# Patient Record
Sex: FEMALE | Race: WHITE | NOT HISPANIC OR LATINO | Employment: UNEMPLOYED | URBAN - METROPOLITAN AREA
[De-identification: names, ages, dates, MRNs, and addresses within clinical notes are randomized per-mention and may not be internally consistent; named-entity substitution may affect disease eponyms.]

---

## 2022-04-24 ENCOUNTER — HOSPITAL ENCOUNTER (EMERGENCY)
Facility: HOSPITAL | Age: 62
Discharge: HOME | End: 2022-04-24
Attending: STUDENT IN AN ORGANIZED HEALTH CARE EDUCATION/TRAINING PROGRAM
Payer: MEDICAID

## 2022-04-24 VITALS
BODY MASS INDEX: 24.27 KG/M2 | DIASTOLIC BLOOD PRESSURE: 70 MMHG | WEIGHT: 145.81 LBS | HEART RATE: 70 BPM | OXYGEN SATURATION: 94 % | TEMPERATURE: 98 F | RESPIRATION RATE: 18 BRPM | SYSTOLIC BLOOD PRESSURE: 112 MMHG

## 2022-04-24 DIAGNOSIS — S01.81XA FACIAL LACERATION, INITIAL ENCOUNTER: ICD-10-CM
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
## 2022-04-24 DIAGNOSIS — S42.292A CLOSED FRACTURE OF HEAD OF LEFT HUMERUS, INITIAL ENCOUNTER: ICD-10-CM

## 2022-04-24 PROCEDURE — 25000003 PHARM REV CODE 250: Performed by: STUDENT IN AN ORGANIZED HEALTH CARE EDUCATION/TRAINING PROGRAM

## 2022-04-24 PROCEDURE — 63600175 PHARM REV CODE 636 W HCPCS: Performed by: STUDENT IN AN ORGANIZED HEALTH CARE EDUCATION/TRAINING PROGRAM

## 2022-04-24 PROCEDURE — 12011 RPR F/E/E/N/L/M 2.5 CM/<: CPT

## 2022-04-24 PROCEDURE — 90471 IMMUNIZATION ADMIN: CPT | Performed by: STUDENT IN AN ORGANIZED HEALTH CARE EDUCATION/TRAINING PROGRAM

## 2022-04-24 PROCEDURE — 90715 TDAP VACCINE 7 YRS/> IM: CPT | Performed by: STUDENT IN AN ORGANIZED HEALTH CARE EDUCATION/TRAINING PROGRAM

## 2022-04-24 PROCEDURE — 99284 EMERGENCY DEPT VISIT MOD MDM: CPT | Mod: 25

## 2022-04-24 PROCEDURE — 96372 THER/PROPH/DIAG INJ SC/IM: CPT | Mod: 59 | Performed by: STUDENT IN AN ORGANIZED HEALTH CARE EDUCATION/TRAINING PROGRAM

## 2022-04-24 RX ORDER — ONDANSETRON 4 MG/1
4 TABLET, ORALLY DISINTEGRATING ORAL
Status: COMPLETED | OUTPATIENT
Start: 2022-04-24 | End: 2022-04-24

## 2022-04-24 RX ORDER — MORPHINE SULFATE 4 MG/ML
4 INJECTION, SOLUTION INTRAMUSCULAR; INTRAVENOUS
Status: COMPLETED | OUTPATIENT
Start: 2022-04-24 | End: 2022-04-24

## 2022-04-24 RX ORDER — HYDROCODONE BITARTRATE AND ACETAMINOPHEN 5; 325 MG/1; MG/1
1 TABLET ORAL EVERY 4 HOURS PRN
Qty: 18 TABLET | Refills: 0 | Status: SHIPPED | OUTPATIENT
Start: 2022-04-24 | End: 2022-04-27

## 2022-04-24 RX ORDER — MORPHINE SULFATE 4 MG/ML
4 INJECTION, SOLUTION INTRAMUSCULAR; INTRAVENOUS
Status: DISCONTINUED | OUTPATIENT
Start: 2022-04-24 | End: 2022-04-24

## 2022-04-24 RX ADMIN — TETANUS TOXOID, REDUCED DIPHTHERIA TOXOID AND ACELLULAR PERTUSSIS VACCINE, ADSORBED 0.5 ML: 5; 2.5; 8; 8; 2.5 SUSPENSION INTRAMUSCULAR at 04:04

## 2022-04-24 RX ADMIN — MORPHINE SULFATE 4 MG: 4 INJECTION INTRAVENOUS at 04:04

## 2022-04-24 RX ADMIN — ONDANSETRON 4 MG: 4 TABLET, ORALLY DISINTEGRATING ORAL at 04:04

## 2022-04-24 NOTE — ED PROVIDER NOTES
Encounter Date: 2022    This document was partially completed using speech recognition software and may contain misspellings, grammatical errors, and/or unexpected word substitutions.       History     Chief Complaint   Patient presents with    Fall     61-year-old female with history of hypertension presents emergency department with her  with a left eyebrow laceration and left shoulder pain.  She was walking at the store when her foot tripped on the carpet and she fell face first.  She did not lose consciousness or get knocked out.  She denies any neck pain or back pain.  Ambulatory afterwards.  She is presenting with left shoulder pain.  Denies any headaches, numbness, weakness, nausea, vomiting.  She felt a crack in her left shoulder. Unknown last tetanus shot.        Review of patient's allergies indicates:  No Known Allergies  Past Medical History:   Diagnosis Date    Hypertension     Kidney problem      Past Surgical History:   Procedure Laterality Date     SECTION, CLASSIC      HYSTERECTOMY      KIDNEY SURGERY       Family History   Problem Relation Age of Onset    Heart attack Father         dec    Heart disease Father      Social History     Tobacco Use    Smoking status: Never Smoker    Smokeless tobacco: Never Used   Substance Use Topics    Alcohol use: Yes     Comment: occasionally    Drug use: No     Review of Systems   Constitutional: Negative for chills and fever.   HENT: Negative for congestion, rhinorrhea and sneezing.    Eyes: Negative for discharge and redness.   Respiratory: Negative for cough and shortness of breath.    Cardiovascular: Negative for chest pain and palpitations.   Gastrointestinal: Negative for abdominal pain, diarrhea, nausea and vomiting.   Genitourinary: Negative for dysuria, frequency, vaginal bleeding and vaginal discharge.   Musculoskeletal: Negative for back pain and neck pain.   Skin: Positive for wound. Negative for rash.   Neurological:  Negative for weakness, numbness and headaches.       Physical Exam     Initial Vitals [04/24/22 1608]   BP Pulse Resp Temp SpO2   (!) 188/89 80 20 98.3 °F (36.8 °C) 99 %      MAP       --         Physical Exam    Nursing note and vitals reviewed.  Constitutional: She appears well-developed. She is not diaphoretic. No distress.   HENT:   Head: Normocephalic. Head is with laceration (1cm shallow laceration - left eyebrow).       Right Ear: External ear normal.   Left Ear: External ear normal.   Eyes: Right eye exhibits no discharge. Left eye exhibits no discharge. No scleral icterus.   Neck: Neck supple.    Full passive range of motion without pain.     Cardiovascular: Normal rate and regular rhythm.   Pulmonary/Chest: Breath sounds normal. No stridor. No respiratory distress. She has no wheezes. She has no rhonchi. She has no rales.   Abdominal: Abdomen is soft. There is no abdominal tenderness. There is no guarding.   Musculoskeletal:         General: No edema.      Left shoulder: Tenderness and bony tenderness present. Decreased range of motion.      Left upper arm: Tenderness and bony tenderness present.      Cervical back: Full passive range of motion without pain and neck supple. No bony tenderness. No spinous process tenderness.      Thoracic back: No bony tenderness.      Lumbar back: No bony tenderness.     Neurological: She is alert and oriented to person, place, and time.   Skin: Skin is warm and dry. Capillary refill takes less than 2 seconds.   Psychiatric: She has a normal mood and affect.         ED Course   Lac Repair    Date/Time: 4/24/2022 4:19 PM  Performed by: Brannon Suazo DO  Authorized by: Brannon Suazo DO     Consent:     Consent obtained:  Verbal    Consent given by:  Patient    Risks, benefits, and alternatives were discussed: yes      Risks discussed:  Infection, need for additional repair, poor cosmetic result, poor wound healing and pain    Alternatives discussed:  Delayed treatment and no  treatment  Universal protocol:     Immediately prior to procedure, a time out was called: yes      Patient identity confirmed:  Verbally with patient  Anesthesia:     Anesthesia method:  None  Laceration details:     Location:  Face    Face location:  L eyebrow    Length (cm):  1    Depth (mm):  2  Pre-procedure details:     Preparation:  Patient was prepped and draped in usual sterile fashion  Exploration:     Hemostasis achieved with:  Direct pressure    Imaging outcome: foreign body not noted      Wound exploration: entire depth of wound visualized      Wound extent: no foreign bodies/material noted      Contaminated: no    Treatment:     Area cleansed with:  Saline    Amount of cleaning:  Standard    Irrigation solution:  Sterile saline    Irrigation volume:  100cc    Irrigation method:  Pressure wash    Visualized foreign bodies/material removed: no      Debridement:  None    Undermining:  None    Scar revision: no    Skin repair:     Repair method:  Tissue adhesive  Approximation:     Approximation:  Close  Repair type:     Repair type:  Simple  Post-procedure details:     Dressing:  Open (no dressing)    Procedure completion:  Tolerated well, no immediate complications      Labs Reviewed - No data to display       Imaging Results          X-Ray Shoulder 2 or More Views Left (Final result)  Result time 04/24/22 16:55:01    Final result by Bing Ramirez MD (04/24/22 16:55:01)                 Impression:      There is a comminuted, displaced left humeral head fracture.      Electronically signed by: Bing Ramirez MD  Date:    04/24/2022  Time:    16:55             Narrative:    EXAMINATION:  XR SHOULDER COMPLETE 2 OR MORE VIEWS LEFT    CLINICAL HISTORY:  fall;    TECHNIQUE:  Two or three views of the left shoulder were performed.    COMPARISON:  None    FINDINGS:  There is a comminuted, displaced and angulated fracture of the proximal humerus a involving the humeral shaft and head.                                  Medications   Tdap (BOOSTRIX) vaccine injection 0.5 mL (0.5 mLs Intramuscular Given 4/24/22 1640)   ondansetron disintegrating tablet 4 mg (4 mg Oral Given 4/24/22 1623)   morphine injection 4 mg (4 mg Intramuscular Given 4/24/22 1627)     Medical Decision Making:   Differential Diagnosis:   DDx: shoulder fracture/dislocation, proximal humerus fracture, head bleed, skull fx, cspine fx, facial bone fx  ED Management:  Based on the patient's evaluation - patient appears stable for discharge home. Found to have a displaced, comminuted left humeral head fracture. Neurovascularly intact, specifically without deltoid numbness. RN placed in a sling and swathe. Lac was repaired in the ED and Tdap updated. Will plan to discharge home with referral to orthopedic surgery and rx for norco. Return precautions given. Patient and  are in agreement with the plan.                      Clinical Impression:   Final diagnoses:  [W19.XXXA] Fall, initial encounter (Primary)  [S01.81XA] Facial laceration, initial encounter  [S42.292A] Closed fracture of head of left humerus, initial encounter          ED Disposition Condition    Discharge Stable        ED Prescriptions     Medication Sig Dispense Start Date End Date Auth. Provider    HYDROcodone-acetaminophen (NORCO) 5-325 mg per tablet Take 1 tablet by mouth every 4 (four) hours as needed for Pain. 18 tablet 4/24/2022  Brannon Suazo DO        Follow-up Information     Follow up With Specialties Details Why Contact Info    Mame Ruiz PA-C Orthopedic Surgery Schedule an appointment as soon as possible for a visit in 1 week  [address] [place], [place] [address] [address]  [phone]             Brannon Suazo DO  04/24/22 1810

## 2022-04-26 ENCOUNTER — TELEPHONE (OUTPATIENT)
Dept: ORTHOPEDICS | Facility: CLINIC | Age: 62
End: 2022-04-26
Payer: MEDICAID

## 2022-04-26 NOTE — TELEPHONE ENCOUNTER
----- Message from Vidhya Roman sent at 4/25/2022  3:10 PM CDT -----  Contact: Nura () [phone]  Type: Appointment Request    Name of Caller: Nura ()   When is the first available appointment? Do not have access  Reason for Visit:  ED f/u for significant call  Best Call Back Number: [phone]  Additional Information:  Patient has Medicaid and will be scheduling as a new patient. Nura indicated he was expecting a phone call from Marcy in the department, but has not heard anything back yet.

## 2022-04-26 NOTE — TELEPHONE ENCOUNTER
----- Message from Marcy Holcomb MA sent at 4/25/2022  3:33 PM CDT -----  Ivonne Persaud,    I called this patient to come in to see us this week but she prefers to be seen in [place]. Would you be able to see this patient this week?  ----- Message -----  From: Fred Bell Jr., MD  Sent: 4/25/2022   2:45 PM CDT  To: Marcy Holcomb MA    This week with me or Jennifer  ----- Message -----  From: Marcy Holcomb MA  Sent: 4/25/2022   2:12 PM CDT  To: Fred Bell Jr., MD    How soon does patent need to be seen?  ----- Message -----  From: Fidelina Stringer MA  Sent: 4/25/2022  10:51 AM CDT  To: [facility] Staff    Type:  Patient Returning Call    Who Called: pt  Does the patient know what this is regarding?: yes appt  Would the patient rather a call back or a response via [facility]? Call back  Best Call Back Number: [phone]  Additional Information: was released from the hospital on yesterday from a fall with shoulder injury and has a referral to Ortho

## 2022-04-27 ENCOUNTER — OFFICE VISIT (OUTPATIENT)
Dept: ORTHOPEDICS | Facility: CLINIC | Age: 62
End: 2022-04-27
Payer: MEDICAID

## 2022-04-27 VITALS — HEIGHT: 65 IN | WEIGHT: 141.5 LBS | BODY MASS INDEX: 23.57 KG/M2
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
## 2022-04-27 DIAGNOSIS — S42.292A CLOSED FRACTURE OF HEAD OF LEFT HUMERUS, INITIAL ENCOUNTER: ICD-10-CM

## 2022-04-27 PROCEDURE — 1159F PR MEDICATION LIST DOCUMENTED IN MEDICAL RECORD: ICD-10-PCS | Mod: CPTII,,, | Performed by: PHYSICIAN ASSISTANT

## 2022-04-27 PROCEDURE — 3008F PR BODY MASS INDEX (BMI) DOCUMENTED: ICD-10-PCS | Mod: CPTII,,, | Performed by: PHYSICIAN ASSISTANT

## 2022-04-27 PROCEDURE — 1160F PR REVIEW ALL MEDS BY PRESCRIBER/CLIN PHARMACIST DOCUMENTED: ICD-10-PCS | Mod: CPTII,,, | Performed by: PHYSICIAN ASSISTANT

## 2022-04-27 PROCEDURE — 1159F MED LIST DOCD IN RCRD: CPT | Mod: CPTII,,, | Performed by: PHYSICIAN ASSISTANT

## 2022-04-27 PROCEDURE — 99203 PR OFFICE/OUTPT VISIT, NEW, LEVL III, 30-44 MIN: ICD-10-PCS | Mod: S$PBB,,, | Performed by: PHYSICIAN ASSISTANT

## 2022-04-27 PROCEDURE — 3008F BODY MASS INDEX DOCD: CPT | Mod: CPTII,,, | Performed by: PHYSICIAN ASSISTANT

## 2022-04-27 PROCEDURE — 99213 OFFICE O/P EST LOW 20 MIN: CPT | Mod: PBBFAC,PN | Performed by: PHYSICIAN ASSISTANT

## 2022-04-27 PROCEDURE — 99999 PR PBB SHADOW E&M-EST. PATIENT-LVL III: CPT | Mod: PBBFAC,,, | Performed by: PHYSICIAN ASSISTANT

## 2022-04-27 PROCEDURE — 99203 OFFICE O/P NEW LOW 30 MIN: CPT | Mod: S$PBB,,, | Performed by: PHYSICIAN ASSISTANT

## 2022-04-27 PROCEDURE — 1160F RVW MEDS BY RX/DR IN RCRD: CPT | Mod: CPTII,,, | Performed by: PHYSICIAN ASSISTANT

## 2022-04-27 PROCEDURE — 99999 PR PBB SHADOW E&M-EST. PATIENT-LVL III: ICD-10-PCS | Mod: PBBFAC,,, | Performed by: PHYSICIAN ASSISTANT

## 2022-04-27 RX ORDER — IBUPROFEN 800 MG/1
800 TABLET ORAL
Qty: 90 TABLET | Refills: 2 | Status: SHIPPED | OUTPATIENT
Start: 2022-04-27

## 2022-04-27 NOTE — PATIENT INSTRUCTIONS
It was nice to meet you today! I am sorry that you are hurting so much.     You broke/fractured your left shoulder. This may need surgery. Dr. Bell wants to get a CT scan of the shoulder to get a better look at things.     Wear sling at all times. Can remove to bathe. Can work on getting the elbow out straight. No use of left arm.     I sent motrin to your pharmacy to help with pain/inflammation. Take as directed with food. Let me know if you need something stronger.     I will call you after CT is done with the results and further plan.     Please stay in touch and call me if you need anything. You can also send me a message in [facility].     Cori   [phone]

## 2022-05-02 ENCOUNTER — HOSPITAL ENCOUNTER (OUTPATIENT)
Dept: RADIOLOGY | Facility: HOSPITAL | Age: 62
Discharge: HOME | End: 2022-05-02
Attending: PHYSICIAN ASSISTANT
Payer: MEDICAID
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
## 2022-05-02 DIAGNOSIS — S42.292A CLOSED FRACTURE OF HEAD OF LEFT HUMERUS, INITIAL ENCOUNTER: ICD-10-CM

## 2022-05-02 PROCEDURE — 73200 CT UPPER EXTREMITY W/O DYE: CPT | Mod: 26,LT,, | Performed by: RADIOLOGY

## 2022-05-02 PROCEDURE — 73200 CT SHOULDER WITHOUT CONTRAST LEFT: ICD-10-PCS | Mod: 26,LT,, | Performed by: RADIOLOGY

## 2022-05-02 PROCEDURE — 73200 CT UPPER EXTREMITY W/O DYE: CPT | Mod: TC,LT

## 2022-05-03 ENCOUNTER — TELEPHONE (OUTPATIENT)
Dept: ORTHOPEDICS | Facility: CLINIC | Age: 62
End: 2022-05-03
Payer: MEDICAID

## 2022-05-03 NOTE — TELEPHONE ENCOUNTER
----- Message from Adriana Mesa sent at 5/3/2022  9:00 AM CDT -----  Contact: pt  Type:  Patient Returning Call    Who Called pt  Who Left Message for Patient: office  Does the patient know what this is regarding?: results  Would the patient rather a call back or a response via [facility]? call  Best Call Back Number: [phone]  Additional Information:

## 2022-05-03 NOTE — TELEPHONE ENCOUNTER
Left message for patient in regards to scheduling appointment for Thursday in [place] with JASWINDER Adames

## 2022-05-03 NOTE — TELEPHONE ENCOUNTER
----- Message from Jennifer Dean PA-C sent at 5/3/2022  8:55 AM CDT -----  No, lets get her in [place]. She needs consents and [place] is here on Thursday  ----- Message -----  From: Marcy Holcomb MA  Sent: 5/3/2022   8:41 AM CDT  To: Jennifer Dean PA-C    She's seen Cori on 4/27..Do you want me to see if Cori can see her?  ----- Message -----  From: Jennifer Dean PA-C  Sent: 5/3/2022   7:16 AM CDT  To: Marcy Holcomb MA    Can you please get this pt an apt on Thursday  Thanks

## 2022-05-03 NOTE — PROGRESS NOTES
"Subjective:      Patient ID: Nancy Thompson is a 61 y.o. female.    Chief Complaint: Pain of the Left Shoulder      HPI  (French)    Last seen by me on 4/27/22 for left proximal humerus fracture DOI 4/24/22.     She had CT scan and is here to discuss results and talk about surgery.     She continues with left shoulder pain. She is right hand dominant. She has intermittent numbness/tingling in left hand. She rates her pain as a 5 on a scale of 1-10. She is taking motrin as needed.     History of HTN. Has not been on medication for years.   PCP: none    She is not vaccinated for covid.       Past Medical History:   Diagnosis Date    Hypertension     Kidney problem          Current Outpatient Medications:     ibuprofen (ADVIL,MOTRIN) 800 MG tablet, Take 1 tablet (800 mg total) by mouth 3 (three) times daily after meals., Disp: 90 tablet, Rfl: 2    Review of patient's allergies indicates:  No Known Allergies    Review of Systems   Constitutional: Negative for chills, fever, night sweats and weight gain.   Gastrointestinal: Negative for bowel incontinence, nausea and vomiting.   Genitourinary: Negative for bladder incontinence.   Neurological: Negative for disturbances in coordination and loss of balance.         Objective:        Ht 5' 5" (1.651 m)   Wt 63.5 kg (139 lb 14.4 oz)   BMI 23.28 kg/m²     Ortho/SPM Exam     Well developed, well nourished patient in no acute distress  Alert and oriented x 3  HEENT- Normal exam  Lungs- Clear to auscultation  Heart- Regular rate and rhythm  Abdomen- Soft nontender    Left shoulder exam:   She has tenderness over proximal humerus/shoulder.   Significant bruising noted about the shoulder into her breast and armpit. Bruising down into hand as well.   Compartments are soft.   ROM of shoulder not tested.     She has good ROM of left wrist/hand.     She is NVI distally with good capillary refill.       XRAY INTERPRETATION:  CT of left shoulder dated 5/2/22 is personally " reviewed and shows:   Severely comminuted, angulated and displaced fracture involving primarily the humeral neck with extension into the humeral head.  No extension to the articular surface.    CT reviewed with Dr. Bell prior to her visit.         Assessment:       Encounter Diagnoses   Name Primary?    Fall, initial encounter Yes    Closed fracture of head of left humerus, sequela           Plan:       Nancy was seen today for pain.    Diagnoses and all orders for this visit:    Fall, initial encounter  -     COVID-19 Routine Screening; Future    Closed fracture of head of left humerus, sequela  -     COVID-19 Routine Screening; Future      She had a trip and fall on 4/24/22 and has left proximal humerus fracture. She has pain in left shoulder. She is right hand dominant    CT shows severely comminuted, angulated and displaced fracture involving primarily the humeral neck with extension into the humeral head.    Treatment options reviewed with Dr. Bell along with above CT scan. Following plan made with patient:     - Dr. Bell recommends surgery for left shoulder fracture, specifically an ORIF of left proximal humerus.  - I reviewed all options with the patient and she wants to proceed with surgery intervention. I went over the procedure in detail, the risk and benefits of the procedure and all questions were answered. Informed consent was obtained and an H&P and consent was completed. Surgery on 5/10/22 in [place].   - Continue with sling. Can remove if watching TV and to bathe. No lifting left arm. Can work on ROM of elbow.   - Norco made her loopy. Continue prn motrin.   - She will need covid test prior to surgery.   - She will follow up postop and prn.     Follow up if symptoms worsen or fail to improve.

## 2022-05-03 NOTE — TELEPHONE ENCOUNTER
Spoke with patient.     Please schedule her to see me on Thursday at 11 for a preop visit. She is aware.

## 2022-05-03 NOTE — H&P (VIEW-ONLY)
"Subjective:      Patient ID: Nancy Thompson is a 61 y.o. female.    Chief Complaint: Pain of the Left Shoulder      HPI  (French)    Last seen by me on 4/27/22 for left proximal humerus fracture DOI 4/24/22.     She had CT scan and is here to discuss results and talk about surgery.     She continues with left shoulder pain. She is right hand dominant. She has intermittent numbness/tingling in left hand. She rates her pain as a 5 on a scale of 1-10. She is taking motrin as needed.     History of HTN. Has not been on medication for years.   PCP: none    She is not vaccinated for covid.       Past Medical History:   Diagnosis Date    Hypertension     Kidney problem          Current Outpatient Medications:     ibuprofen (ADVIL,MOTRIN) 800 MG tablet, Take 1 tablet (800 mg total) by mouth 3 (three) times daily after meals., Disp: 90 tablet, Rfl: 2    Review of patient's allergies indicates:  No Known Allergies    Review of Systems   Constitutional: Negative for chills, fever, night sweats and weight gain.   Gastrointestinal: Negative for bowel incontinence, nausea and vomiting.   Genitourinary: Negative for bladder incontinence.   Neurological: Negative for disturbances in coordination and loss of balance.         Objective:        Ht 5' 5" (1.651 m)   Wt 63.5 kg (139 lb 14.4 oz)   BMI 23.28 kg/m²     Ortho/SPM Exam     Well developed, well nourished patient in no acute distress  Alert and oriented x 3  HEENT- Normal exam  Lungs- Clear to auscultation  Heart- Regular rate and rhythm  Abdomen- Soft nontender    Left shoulder exam:   She has tenderness over proximal humerus/shoulder.   Significant bruising noted about the shoulder into her breast and armpit. Bruising down into hand as well.   Compartments are soft.   ROM of shoulder not tested.     She has good ROM of left wrist/hand.     She is NVI distally with good capillary refill.       XRAY INTERPRETATION:  CT of left shoulder dated 5/2/22 is personally " reviewed and shows:   Severely comminuted, angulated and displaced fracture involving primarily the humeral neck with extension into the humeral head.  No extension to the articular surface.    CT reviewed with Dr. Bell prior to her visit.         Assessment:       Encounter Diagnoses   Name Primary?    Fall, initial encounter Yes    Closed fracture of head of left humerus, sequela           Plan:       Nancy was seen today for pain.    Diagnoses and all orders for this visit:    Fall, initial encounter  -     COVID-19 Routine Screening; Future    Closed fracture of head of left humerus, sequela  -     COVID-19 Routine Screening; Future      She had a trip and fall on 4/24/22 and has left proximal humerus fracture. She has pain in left shoulder. She is right hand dominant    CT shows severely comminuted, angulated and displaced fracture involving primarily the humeral neck with extension into the humeral head.    Treatment options reviewed with Dr. Bell along with above CT scan. Following plan made with patient:     - Dr. Bell recommends surgery for left shoulder fracture, specifically an ORIF of left proximal humerus.  - I reviewed all options with the patient and she wants to proceed with surgery intervention. I went over the procedure in detail, the risk and benefits of the procedure and all questions were answered. Informed consent was obtained and an H&P and consent was completed. Surgery on 5/10/22 in [place].   - Continue with sling. Can remove if watching TV and to bathe. No lifting left arm. Can work on ROM of elbow.   - Norco made her loopy. Continue prn motrin.   - She will need covid test prior to surgery.   - She will follow up postop and prn.     Follow up if symptoms worsen or fail to improve.

## 2022-05-04 ENCOUNTER — TELEPHONE (OUTPATIENT)
Dept: ORTHOPEDICS | Facility: CLINIC | Age: 62
End: 2022-05-04

## 2022-05-04 ENCOUNTER — OFFICE VISIT (OUTPATIENT)
Dept: ORTHOPEDICS | Facility: CLINIC | Age: 62
End: 2022-05-04
Payer: MEDICAID

## 2022-05-04 ENCOUNTER — TELEPHONE (OUTPATIENT)
Dept: ORTHOPEDICS | Facility: CLINIC | Age: 62
End: 2022-05-04
Payer: MEDICAID

## 2022-05-04 VITALS — WEIGHT: 139.88 LBS | BODY MASS INDEX: 23.31 KG/M2 | HEIGHT: 65 IN

## 2022-05-04 DIAGNOSIS — S42.209A PROXIMAL HUMERAL FRACTURE: ICD-10-CM

## 2022-05-04 DIAGNOSIS — S42.292S CLOSED FRACTURE OF HEAD OF LEFT HUMERUS, SEQUELA: ICD-10-CM

## 2022-05-04 DIAGNOSIS — S42.202A CLOSED FRACTURE OF PROXIMAL END OF LEFT HUMERUS, UNSPECIFIED FRACTURE MORPHOLOGY, INITIAL ENCOUNTER: Primary | ICD-10-CM
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
## 2022-05-04 PROCEDURE — 99213 OFFICE O/P EST LOW 20 MIN: CPT | Mod: PBBFAC,PN | Performed by: PHYSICIAN ASSISTANT

## 2022-05-04 PROCEDURE — 99499 NO LOS: ICD-10-PCS | Mod: S$PBB,,, | Performed by: PHYSICIAN ASSISTANT

## 2022-05-04 PROCEDURE — 1159F PR MEDICATION LIST DOCUMENTED IN MEDICAL RECORD: ICD-10-PCS | Mod: CPTII,,, | Performed by: PHYSICIAN ASSISTANT

## 2022-05-04 PROCEDURE — 1159F MED LIST DOCD IN RCRD: CPT | Mod: CPTII,,, | Performed by: PHYSICIAN ASSISTANT

## 2022-05-04 PROCEDURE — 3008F PR BODY MASS INDEX (BMI) DOCUMENTED: ICD-10-PCS | Mod: CPTII,,, | Performed by: PHYSICIAN ASSISTANT

## 2022-05-04 PROCEDURE — 1160F RVW MEDS BY RX/DR IN RCRD: CPT | Mod: CPTII,,, | Performed by: PHYSICIAN ASSISTANT

## 2022-05-04 PROCEDURE — 99999 PR PBB SHADOW E&M-EST. PATIENT-LVL III: ICD-10-PCS | Mod: PBBFAC,,, | Performed by: PHYSICIAN ASSISTANT

## 2022-05-04 PROCEDURE — 3008F BODY MASS INDEX DOCD: CPT | Mod: CPTII,,, | Performed by: PHYSICIAN ASSISTANT

## 2022-05-04 PROCEDURE — 1160F PR REVIEW ALL MEDS BY PRESCRIBER/CLIN PHARMACIST DOCUMENTED: ICD-10-PCS | Mod: CPTII,,, | Performed by: PHYSICIAN ASSISTANT

## 2022-05-04 PROCEDURE — 99499 UNLISTED E&M SERVICE: CPT | Mod: S$PBB,,, | Performed by: PHYSICIAN ASSISTANT

## 2022-05-04 PROCEDURE — 99999 PR PBB SHADOW E&M-EST. PATIENT-LVL III: CPT | Mod: PBBFAC,,, | Performed by: PHYSICIAN ASSISTANT

## 2022-05-04 RX ORDER — CEFAZOLIN SODIUM 2 G/50ML
2 SOLUTION INTRAVENOUS
Status: CANCELLED | OUTPATIENT
Start: 2022-05-04

## 2022-05-04 NOTE — TELEPHONE ENCOUNTER
Spoke with pt offering to do surgery on 5/6 Friday instead of Tuesday. Pt confirmed. Informed her no need for the Covid test on Saturday.     ----- Message from Cori Smith PA-C sent at 5/4/2022 10:40 AM CDT -----  Please call her and see if she can do surgery in [place] on Friday 5/6 instead of Tuesday.     Once you know either way, please send a staff message to Dr. Bell to let him know.     Thanks!

## 2022-05-04 NOTE — TELEPHONE ENCOUNTER
----- Message from Janina Bishop sent at 5/4/2022  8:00 AM CDT -----  Regarding: call back  Contact: [phone]  Type:  Patient Returning Call    Who Called: PT   Who Left Message for Patient: Nurse   Does the patient know what this is regarding?: Scheduling   Would the patient rather a call back or a response via [facility]? Call back   Best Call Back Number: [phone]  Additional Information:

## 2022-05-05 NOTE — PLAN OF CARE
Spoke to patient, she is unable to come for surgery tomorrow. Wants to post pone to next Friday. Message sent to  and his office. Case placed in the depot.

## 2022-05-05 NOTE — PLAN OF CARE
Voice mail left for pt to return call to office or scheduling to confirm a new surgery date for next Tuesday at Dr. Bell request. Phone numbers left for both office and scheduling .

## 2022-05-07 ENCOUNTER — LAB VISIT (OUTPATIENT)
Dept: SURGERY | Facility: CLINIC | Age: 62
End: 2022-05-07
Payer: MEDICAID
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
## 2022-05-07 DIAGNOSIS — S42.292S CLOSED FRACTURE OF HEAD OF LEFT HUMERUS, SEQUELA: ICD-10-CM

## 2022-05-07 LAB — SARS-COV-2 RNA RESP QL NAA+PROBE: NOT DETECTED

## 2022-05-07 PROCEDURE — U0005 INFEC AGEN DETEC AMPLI PROBE: HCPCS | Performed by: PHYSICIAN ASSISTANT

## 2022-05-07 PROCEDURE — U0003 INFECTIOUS AGENT DETECTION BY NUCLEIC ACID (DNA OR RNA); SEVERE ACUTE RESPIRATORY SYNDROME CORONAVIRUS 2 (SARS-COV-2) (CORONAVIRUS DISEASE [COVID-19]), AMPLIFIED PROBE TECHNIQUE, MAKING USE OF HIGH THROUGHPUT TECHNOLOGIES AS DESCRIBED BY CMS-2020-01-R: HCPCS | Performed by: PHYSICIAN ASSISTANT

## 2022-05-10 ENCOUNTER — HOSPITAL ENCOUNTER (INPATIENT)
Facility: HOSPITAL | Age: 62
LOS: 1 days | Discharge: HOME | DRG: 494 | End: 2022-05-11
Attending: ORTHOPAEDIC SURGERY | Admitting: ORTHOPAEDIC SURGERY
Payer: MEDICAID

## 2022-05-10 ENCOUNTER — ANESTHESIA EVENT (OUTPATIENT)
Dept: SURGERY | Facility: HOSPITAL | Age: 62
DRG: 494 | End: 2022-05-10
Payer: MEDICAID

## 2022-05-10 ENCOUNTER — ANESTHESIA (OUTPATIENT)
Dept: SURGERY | Facility: HOSPITAL | Age: 62
DRG: 494 | End: 2022-05-10
Payer: MEDICAID

## 2022-05-10 DIAGNOSIS — S42.209A PROXIMAL HUMERAL FRACTURE: ICD-10-CM

## 2022-05-10 DIAGNOSIS — S42.202A CLOSED FRACTURE OF PROXIMAL END OF LEFT HUMERUS, UNSPECIFIED FRACTURE MORPHOLOGY, INITIAL ENCOUNTER: ICD-10-CM

## 2022-05-10 DIAGNOSIS — Z98.890 S/P ORIF (OPEN REDUCTION INTERNAL FIXATION) FRACTURE: Primary | ICD-10-CM

## 2022-05-10 DIAGNOSIS — Z87.81 S/P ORIF (OPEN REDUCTION INTERNAL FIXATION) FRACTURE: Primary | ICD-10-CM

## 2022-05-10 DIAGNOSIS — Z01.818 PREOP TESTING: ICD-10-CM

## 2022-05-10 LAB
ANION GAP SERPL CALC-SCNC: 13 MMOL/L (ref 8–16)
BASOPHILS # BLD AUTO: 0.07 K/UL (ref 0–0.2)
BASOPHILS NFR BLD: 1.2 % (ref 0–1.9)
BUN SERPL-MCNC: 9 MG/DL (ref 6–20)
CALCIUM SERPL-MCNC: 10.3 MG/DL (ref 8.7–10.5)
CHLORIDE SERPL-SCNC: 101 MMOL/L (ref 95–110)
CO2 SERPL-SCNC: 26 MMOL/L (ref 23–29)
CREAT SERPL-MCNC: 0.7 MG/DL (ref 0.5–1.4)
DIFFERENTIAL METHOD: ABNORMAL
EOSINOPHIL # BLD AUTO: 0.1 K/UL (ref 0–0.5)
EOSINOPHIL NFR BLD: 1.5 % (ref 0–8)
ERYTHROCYTE [DISTWIDTH] IN BLOOD BY AUTOMATED COUNT: 12.4 % (ref 11.5–14.5)
EST. GFR  (AFRICAN AMERICAN): >60 ML/MIN/1.73 M^2
EST. GFR  (NON AFRICAN AMERICAN): >60 ML/MIN/1.73 M^2
GLUCOSE SERPL-MCNC: 96 MG/DL (ref 70–110)
HCT VFR BLD AUTO: 38 % (ref 37–48.5)
HGB BLD-MCNC: 12.8 G/DL (ref 12–16)
IMM GRANULOCYTES # BLD AUTO: 0.01 K/UL (ref 0–0.04)
IMM GRANULOCYTES NFR BLD AUTO: 0.2 % (ref 0–0.5)
LYMPHOCYTES # BLD AUTO: 1.1 K/UL (ref 1–4.8)
LYMPHOCYTES NFR BLD: 18.8 % (ref 18–48)
MCH RBC QN AUTO: 33.4 PG (ref 27–31)
MCHC RBC AUTO-ENTMCNC: 33.7 G/DL (ref 32–36)
MCV RBC AUTO: 99 FL (ref 82–98)
MONOCYTES # BLD AUTO: 0.6 K/UL (ref 0.3–1)
MONOCYTES NFR BLD: 10 % (ref 4–15)
NEUTROPHILS # BLD AUTO: 4 K/UL (ref 1.8–7.7)
NEUTROPHILS NFR BLD: 68.3 % (ref 38–73)
NRBC BLD-RTO: 0 /100 WBC
PLATELET # BLD AUTO: 354 K/UL (ref 150–450)
PMV BLD AUTO: 9.4 FL (ref 9.2–12.9)
POTASSIUM SERPL-SCNC: 4.6 MMOL/L (ref 3.5–5.1)
RBC # BLD AUTO: 3.83 M/UL (ref 4–5.4)
SODIUM SERPL-SCNC: 140 MMOL/L (ref 136–145)
WBC # BLD AUTO: 5.81 K/UL (ref 3.9–12.7)

## 2022-05-10 PROCEDURE — 25000003 PHARM REV CODE 250: Performed by: STUDENT IN AN ORGANIZED HEALTH CARE EDUCATION/TRAINING PROGRAM

## 2022-05-10 PROCEDURE — 71000033 HC RECOVERY, INTIAL HOUR: Performed by: ORTHOPAEDIC SURGERY

## 2022-05-10 PROCEDURE — 25000003 PHARM REV CODE 250: Performed by: NURSE ANESTHETIST, CERTIFIED REGISTERED

## 2022-05-10 PROCEDURE — 80048 BASIC METABOLIC PNL TOTAL CA: CPT | Performed by: ORTHOPAEDIC SURGERY

## 2022-05-10 PROCEDURE — 93010 ELECTROCARDIOGRAM REPORT: CPT | Mod: ,,, | Performed by: INTERNAL MEDICINE

## 2022-05-10 PROCEDURE — 27201423 OPTIME MED/SURG SUP & DEVICES STERILE SUPPLY: Performed by: ORTHOPAEDIC SURGERY

## 2022-05-10 PROCEDURE — 36415 COLL VENOUS BLD VENIPUNCTURE: CPT | Performed by: ORTHOPAEDIC SURGERY

## 2022-05-10 PROCEDURE — 23615 OPTX PROX HUMRL FX W/INT FIX: CPT | Mod: LT,,, | Performed by: ORTHOPAEDIC SURGERY

## 2022-05-10 PROCEDURE — 63600175 PHARM REV CODE 636 W HCPCS: Performed by: STUDENT IN AN ORGANIZED HEALTH CARE EDUCATION/TRAINING PROGRAM

## 2022-05-10 PROCEDURE — 23615 PR OPEN TREATMENT PROX HUMERAL FRACTURE: ICD-10-PCS | Mod: LT,,, | Performed by: ORTHOPAEDIC SURGERY

## 2022-05-10 PROCEDURE — 85025 COMPLETE CBC W/AUTO DIFF WBC: CPT | Performed by: ANESTHESIOLOGY

## 2022-05-10 PROCEDURE — 27800903 OPTIME MED/SURG SUP & DEVICES OTHER IMPLANTS: Performed by: ORTHOPAEDIC SURGERY

## 2022-05-10 PROCEDURE — 93005 ELECTROCARDIOGRAM TRACING: CPT

## 2022-05-10 PROCEDURE — 63600175 PHARM REV CODE 636 W HCPCS: Performed by: NURSE ANESTHETIST, CERTIFIED REGISTERED

## 2022-05-10 PROCEDURE — 36000710: Performed by: ORTHOPAEDIC SURGERY

## 2022-05-10 PROCEDURE — 94761 N-INVAS EAR/PLS OXIMETRY MLT: CPT

## 2022-05-10 PROCEDURE — 63600175 PHARM REV CODE 636 W HCPCS: Performed by: ORTHOPAEDIC SURGERY

## 2022-05-10 PROCEDURE — 11000001 HC ACUTE MED/SURG PRIVATE ROOM

## 2022-05-10 PROCEDURE — C1713 ANCHOR/SCREW BN/BN,TIS/BN: HCPCS | Performed by: ORTHOPAEDIC SURGERY

## 2022-05-10 PROCEDURE — 93010 EKG 12-LEAD: ICD-10-PCS | Mod: ,,, | Performed by: INTERNAL MEDICINE

## 2022-05-10 PROCEDURE — 37000009 HC ANESTHESIA EA ADD 15 MINS: Performed by: ORTHOPAEDIC SURGERY

## 2022-05-10 PROCEDURE — C9290 INJ, BUPIVACAINE LIPOSOME: HCPCS | Performed by: STUDENT IN AN ORGANIZED HEALTH CARE EDUCATION/TRAINING PROGRAM

## 2022-05-10 PROCEDURE — 25000003 PHARM REV CODE 250: Performed by: ORTHOPAEDIC SURGERY

## 2022-05-10 PROCEDURE — 37000008 HC ANESTHESIA 1ST 15 MINUTES: Performed by: ORTHOPAEDIC SURGERY

## 2022-05-10 PROCEDURE — 36000711: Performed by: ORTHOPAEDIC SURGERY

## 2022-05-10 DEVICE — CHIPS CRUSH CANCELLOUS 15ML: Type: IMPLANTABLE DEVICE | Site: SHOULDER | Status: FUNCTIONAL

## 2022-05-10 RX ORDER — ONDANSETRON 2 MG/ML
INJECTION INTRAMUSCULAR; INTRAVENOUS
Status: DISCONTINUED | OUTPATIENT
Start: 2022-05-10 | End: 2022-05-10

## 2022-05-10 RX ORDER — CEFAZOLIN SODIUM 2 G/50ML
2 SOLUTION INTRAVENOUS
Status: COMPLETED | OUTPATIENT
Start: 2022-05-10 | End: 2022-05-11

## 2022-05-10 RX ORDER — PHENYLEPHRINE HYDROCHLORIDE 10 MG/ML
INJECTION INTRAVENOUS
Status: DISCONTINUED | OUTPATIENT
Start: 2022-05-10 | End: 2022-05-10

## 2022-05-10 RX ORDER — OXYCODONE HYDROCHLORIDE 5 MG/1
10 TABLET ORAL EVERY 4 HOURS PRN
Status: DISCONTINUED | OUTPATIENT
Start: 2022-05-10 | End: 2022-05-11 | Stop reason: HOSPADM

## 2022-05-10 RX ORDER — TRANEXAMIC ACID 100 MG/ML
INJECTION, SOLUTION INTRAVENOUS
Status: DISCONTINUED | OUTPATIENT
Start: 2022-05-10 | End: 2022-05-10

## 2022-05-10 RX ORDER — SODIUM CHLORIDE 0.9 % (FLUSH) 0.9 %
10 SYRINGE (ML) INJECTION
Status: DISCONTINUED | OUTPATIENT
Start: 2022-05-10 | End: 2022-05-11 | Stop reason: HOSPADM

## 2022-05-10 RX ORDER — PROPOFOL 10 MG/ML
VIAL (ML) INTRAVENOUS
Status: DISCONTINUED | OUTPATIENT
Start: 2022-05-10 | End: 2022-05-10

## 2022-05-10 RX ORDER — SUCCINYLCHOLINE CHLORIDE 20 MG/ML
INJECTION INTRAMUSCULAR; INTRAVENOUS
Status: DISCONTINUED | OUTPATIENT
Start: 2022-05-10 | End: 2022-05-10

## 2022-05-10 RX ORDER — ACETAMINOPHEN 10 MG/ML
INJECTION, SOLUTION INTRAVENOUS
Status: DISCONTINUED | OUTPATIENT
Start: 2022-05-10 | End: 2022-05-10

## 2022-05-10 RX ORDER — NEOSTIGMINE METHYLSULFATE 1 MG/ML
INJECTION, SOLUTION INTRAVENOUS
Status: DISCONTINUED | OUTPATIENT
Start: 2022-05-10 | End: 2022-05-10

## 2022-05-10 RX ORDER — CEFAZOLIN SODIUM 2 G/50ML
2 SOLUTION INTRAVENOUS
Status: DISCONTINUED | OUTPATIENT
Start: 2022-05-10 | End: 2022-05-10 | Stop reason: HOSPADM

## 2022-05-10 RX ORDER — HYDROMORPHONE HYDROCHLORIDE 2 MG/ML
0.4 INJECTION, SOLUTION INTRAMUSCULAR; INTRAVENOUS; SUBCUTANEOUS EVERY 5 MIN PRN
Status: DISCONTINUED | OUTPATIENT
Start: 2022-05-10 | End: 2022-05-10 | Stop reason: HOSPADM

## 2022-05-10 RX ORDER — MIDAZOLAM HYDROCHLORIDE 1 MG/ML
INJECTION, SOLUTION INTRAMUSCULAR; INTRAVENOUS
Status: DISCONTINUED | OUTPATIENT
Start: 2022-05-10 | End: 2022-05-10

## 2022-05-10 RX ORDER — KETOROLAC TROMETHAMINE 30 MG/ML
INJECTION, SOLUTION INTRAMUSCULAR; INTRAVENOUS
Status: DISCONTINUED | OUTPATIENT
Start: 2022-05-10 | End: 2022-05-10

## 2022-05-10 RX ORDER — ONDANSETRON 2 MG/ML
4 INJECTION INTRAMUSCULAR; INTRAVENOUS DAILY PRN
Status: DISCONTINUED | OUTPATIENT
Start: 2022-05-10 | End: 2022-05-10 | Stop reason: HOSPADM

## 2022-05-10 RX ORDER — LIDOCAINE HCL/PF 100 MG/5ML
SYRINGE (ML) INTRAVENOUS
Status: DISCONTINUED | OUTPATIENT
Start: 2022-05-10 | End: 2022-05-10

## 2022-05-10 RX ORDER — OXYCODONE AND ACETAMINOPHEN 5; 325 MG/1; MG/1
1 TABLET ORAL EVERY 4 HOURS PRN
Status: DISCONTINUED | OUTPATIENT
Start: 2022-05-10 | End: 2022-05-11 | Stop reason: HOSPADM

## 2022-05-10 RX ORDER — FENTANYL CITRATE 50 UG/ML
INJECTION, SOLUTION INTRAMUSCULAR; INTRAVENOUS
Status: DISCONTINUED | OUTPATIENT
Start: 2022-05-10 | End: 2022-05-10

## 2022-05-10 RX ORDER — BUPIVACAINE HYDROCHLORIDE 2.5 MG/ML
INJECTION, SOLUTION EPIDURAL; INFILTRATION; INTRACAUDAL
Status: DISCONTINUED | OUTPATIENT
Start: 2022-05-10 | End: 2022-05-10

## 2022-05-10 RX ORDER — ROCURONIUM BROMIDE 10 MG/ML
INJECTION, SOLUTION INTRAVENOUS
Status: DISCONTINUED | OUTPATIENT
Start: 2022-05-10 | End: 2022-05-10

## 2022-05-10 RX ORDER — DOCUSATE SODIUM 100 MG/1
100 CAPSULE, LIQUID FILLED ORAL 2 TIMES DAILY
Status: DISCONTINUED | OUTPATIENT
Start: 2022-05-10 | End: 2022-05-11 | Stop reason: HOSPADM

## 2022-05-10 RX ORDER — DEXAMETHASONE SODIUM PHOSPHATE 4 MG/ML
INJECTION, SOLUTION INTRA-ARTICULAR; INTRALESIONAL; INTRAMUSCULAR; INTRAVENOUS; SOFT TISSUE
Status: DISCONTINUED | OUTPATIENT
Start: 2022-05-10 | End: 2022-05-10

## 2022-05-10 RX ORDER — ONDANSETRON 8 MG/1
8 TABLET, ORALLY DISINTEGRATING ORAL EVERY 8 HOURS PRN
Status: DISCONTINUED | OUTPATIENT
Start: 2022-05-10 | End: 2022-05-11 | Stop reason: HOSPADM

## 2022-05-10 RX ADMIN — CEFAZOLIN SODIUM 2 G: 2 SOLUTION INTRAVENOUS at 10:05

## 2022-05-10 RX ADMIN — ACETAMINOPHEN 1000 MG: 10 INJECTION, SOLUTION INTRAVENOUS at 05:05

## 2022-05-10 RX ADMIN — ONDANSETRON 8 MG: 2 INJECTION, SOLUTION INTRAMUSCULAR; INTRAVENOUS at 05:05

## 2022-05-10 RX ADMIN — BUPIVACAINE 10 ML: 13.3 INJECTION, SUSPENSION, LIPOSOMAL INFILTRATION at 02:05

## 2022-05-10 RX ADMIN — PHENYLEPHRINE HYDROCHLORIDE 100 MCG: 10 INJECTION INTRAVENOUS at 04:05

## 2022-05-10 RX ADMIN — ROCURONIUM BROMIDE 5 MG: 10 INJECTION, SOLUTION INTRAVENOUS at 02:05

## 2022-05-10 RX ADMIN — SODIUM CHLORIDE, SODIUM LACTATE, POTASSIUM CHLORIDE, AND CALCIUM CHLORIDE: .6; .31; .03; .02 INJECTION, SOLUTION INTRAVENOUS at 02:05

## 2022-05-10 RX ADMIN — PHENYLEPHRINE HYDROCHLORIDE 150 MCG: 10 INJECTION INTRAVENOUS at 03:05

## 2022-05-10 RX ADMIN — ROCURONIUM BROMIDE 45 MG: 10 INJECTION, SOLUTION INTRAVENOUS at 02:05

## 2022-05-10 RX ADMIN — DEXAMETHASONE SODIUM PHOSPHATE 8 MG: 4 INJECTION, SOLUTION INTRA-ARTICULAR; INTRALESIONAL; INTRAMUSCULAR; INTRAVENOUS; SOFT TISSUE at 02:05

## 2022-05-10 RX ADMIN — MIDAZOLAM 2 MG: 1 INJECTION INTRAMUSCULAR; INTRAVENOUS at 02:05

## 2022-05-10 RX ADMIN — KETOROLAC TROMETHAMINE 30 MG: 30 INJECTION, SOLUTION INTRAMUSCULAR; INTRAVENOUS at 05:05

## 2022-05-10 RX ADMIN — TRANEXAMIC ACID 1000 MG: 100 INJECTION, SOLUTION INTRAVENOUS at 03:05

## 2022-05-10 RX ADMIN — NEOSTIGMINE METHYLSULFATE 4 MG: 1 INJECTION INTRAVENOUS at 05:05

## 2022-05-10 RX ADMIN — PHENYLEPHRINE HYDROCHLORIDE 0.2 MCG/KG/MIN: 10 INJECTION INTRAVENOUS at 03:05

## 2022-05-10 RX ADMIN — FENTANYL CITRATE 100 MCG: 50 INJECTION, SOLUTION INTRAMUSCULAR; INTRAVENOUS at 02:05

## 2022-05-10 RX ADMIN — SUCCINYLCHOLINE CHLORIDE 120 MG: 20 INJECTION, SOLUTION INTRAMUSCULAR; INTRAVENOUS at 02:05

## 2022-05-10 RX ADMIN — LIDOCAINE HYDROCHLORIDE 100 MG: 20 INJECTION, SOLUTION INTRAVENOUS at 02:05

## 2022-05-10 RX ADMIN — GLYCOPYRROLATE 0.6 MG: 0.2 INJECTION, SOLUTION INTRAMUSCULAR; INTRAVITREAL at 05:05

## 2022-05-10 RX ADMIN — DOCUSATE SODIUM 100 MG: 100 CAPSULE, LIQUID FILLED ORAL at 10:05

## 2022-05-10 RX ADMIN — PHENYLEPHRINE HYDROCHLORIDE 100 MCG: 10 INJECTION INTRAVENOUS at 05:05

## 2022-05-10 RX ADMIN — PROPOFOL 110 MG: 10 INJECTION, EMULSION INTRAVENOUS at 02:05

## 2022-05-10 RX ADMIN — PHENYLEPHRINE HYDROCHLORIDE 100 MCG: 10 INJECTION INTRAVENOUS at 03:05

## 2022-05-10 RX ADMIN — BUPIVACAINE HYDROCHLORIDE 10 ML: 2.5 INJECTION, SOLUTION EPIDURAL; INFILTRATION; INTRACAUDAL; PERINEURAL at 02:05

## 2022-05-10 RX ADMIN — CEFAZOLIN SODIUM 2 G: 2 SOLUTION INTRAVENOUS at 02:05

## 2022-05-10 NOTE — ANESTHESIA PREPROCEDURE EVALUATION
05/10/2022  Nancy Thompson is a 61 y.o., female with PMH of HTN fell and landed on her shoulder on , now scheduled for an ORIF of L shoulder.       Review of patient's allergies indicates:   Allergen Reactions    Hydrocodone Nausea And Vomiting      Past Medical History:   Diagnosis Date    Hypertension     Kidney problem      Past Surgical History:   Procedure Laterality Date     SECTION, CLASSIC      HYSTERECTOMY      KIDNEY SURGERY           Pre-op Assessment    I have reviewed the Patient Summary Reports.     I have reviewed the Nursing Notes. I have reviewed the NPO Status.   I have reviewed the Medications.     Review of Systems  Anesthesia Hx:  No problems with previous Anesthesia  Neg history of prior surgery. Denies Family Hx of Anesthesia complications.    Social:  Non-Smoker, Alcohol Use    Hematology/Oncology:  Hematology Normal        EENT/Dental:EENT/Dental Normal   Cardiovascular:   Exercise tolerance: good Hypertension, well controlled    Pulmonary:  Pulmonary Normal    Musculoskeletal:  Musculoskeletal Normal    Endocrine:  Endocrine Normal    Psych:  Psychiatric Normal           Physical Exam  General: Well nourished, Cooperative, Alert and Oriented    Airway:  Mallampati: II   Mouth Opening: Normal  Neck ROM: Normal ROM    Dental:    Chest/Lungs:  Clear to auscultation, Normal Respiratory Rate    Heart:  Rate: Normal  Rhythm: Regular Rhythm        Anesthesia Plan  Type of Anesthesia, risks & benefits discussed:    Anesthesia Type: Gen ETT, Regional, MAC  Intra-op Monitoring Plan: Standard ASA Monitors  Post Op Pain Control Plan: multimodal analgesia  Airway Plan: Direct  Informed Consent: Informed consent signed with the Patient and all parties understand the risks and agree with anesthesia plan.  All questions answered.   ASA Score: 2    Ready For Surgery From  Anesthesia Perspective.     .

## 2022-05-10 NOTE — PLAN OF CARE
Received from Or per stretcher/bed. Monitors and O2 applied. VSS See flow sheets. Family updated per text system.

## 2022-05-10 NOTE — TRANSFER OF CARE
"Anesthesia Transfer of Care Note    Patient: Nancy Thompson    Procedure(s) Performed: Procedure(s) (LRB):  ORIF, SHOULDER (Left)    Patient location: PACU    Anesthesia Type: general    Transport from OR: Transported from OR on 6-10 L/min O2 by face mask with adequate spontaneous ventilation    Post pain: adequate analgesia    Post assessment: no apparent anesthetic complications and tolerated procedure well    Post vital signs: stable    Level of consciousness: awake, alert and oriented    Nausea/Vomiting: no nausea/vomiting    Complications: none    Transfer of care protocol was followed      Last vitals:   Visit Vitals  BP (!) 148/83   Pulse 92   Temp 36.5 °C (97.7 °F) (Skin)   Resp 16   Ht 5' 5" (1.651 m)   Wt 63 kg (139 lb)   SpO2 100%   Breastfeeding No   BMI 23.13 kg/m²     "

## 2022-05-10 NOTE — OP NOTE
[facility] - Surgery (Hospital)  Operative Note      Date of Procedure: 5/10/2022     Procedure: Procedure(s) (LRB):  ORIF, SHOULDER (Left)     Surgeon(s) and Role:     * Fred Bell Jr., MD - Primary    Assisting Surgeon: None    Pre-Operative Diagnosis: Closed fracture of proximal end of left humerus, unspecified fracture morphology, initial encounter [S42.202A]    Post-Operative Diagnosis: Post-Op Diagnosis Codes:     * Closed fracture of proximal end of left humerus, unspecified fracture morphology, initial encounter [S42.202A]    Anesthesia: General/Regional    Operative Findings (including complications, if any):  Left proximal humerus fracture    Description of Technical Procedures:     Preop diagnosis:  3 part fracture left proximal humerus displaced    Postop diagnosis:  Same.    Operative procedure:  Open reduction internal fixation of left proximal humerus 3 part fracture using Scooby locking plate.    Surgeon:  Ray Ritchie assistant:  Dianne.    Anesthesia:  General.    EBL:  200 cc.    Complications:  None.    Operative procedure in detail as follows:    After operative consent was obtained the patient brought the operating room placed supine operating table.  Anesthesia by general endotracheal method performed by the anesthesia staff.  After the patient was asleep she was carefully placed in the beach chair position head secured left shoulder upper extremity prepped and draped out in the normal sterile fashion.  A deltopectoral incision made with a 10 blade over the left shoulder.  Full-thickness skin flaps were raised hemostasis achieved with Bovie.  The cephalic vein was damaged and was tied off and bovied.  Deep dissection continued along the conjoined tendon laterally.  The fracture site was identified noted to have a large amount of hematoma and healing tissue.  Careful dissection used to open the periosteum and expose the fracture which was severely comminuted at the level of the surgical  neck.  There was also a fracture of the greater tuberosity however the lesser tuberosity was attached to the humeral head.  The main fracture fragments were carefully disimpacted with the elevator and the [place]chard.  The shaft was brought out to length and a trial reduction was performed and held with K-wires.  Checked under C-arm control fracture was noted to be well reduced with severe comminution however at the surgical neck.  The Scooby locking plate was then applied laterally with combination of K-wires and screws initially this was adjusted several times to get the appropriate orientation and finally screws were placed in a locking manner into the humeral head and then into the shaft as well.  The C-arm was brought in and careful fluoroscopy views were taken to ensure the screw lengths were not too long and several were shortened as needed.  The fracture was noted to be well reduced but there was severe comminution and bone loss along the anteromedial edge so bone graft of corticocancellous allograft chips were placed into the void.    The wound then thoroughly irrigated with antibiotic saline solution under pulse lavage hemostasis achieved with Bovie.  The periosteum closed deeply and the large sutures were placed using 5. FiberWire through the rotator cuff adjacent to the greater and lesser tuberosities this was passed through the plate and also in a figure-of-eight fashion to augment the repair.    The deltopectoral interval closed with 1. Vicryl subcutaneous tissue closed with 2-0 Vicryl and Steri-Strips on the skin.  Sterile dressing applied followed by sling the patient extubated brought to the recovery room in stable condition all sponge needle counts reported as correct no complications     Significant Surgical Tasks Conducted by the Assistant(s), if Applicable: retraction    Estimated Blood Loss (EBL): * No values recorded between 5/10/2022  3:12 PM and 5/10/2022  6:08 PM *           Implants:   Implant  Name Type Inv. Item Serial No.  Lot No. LRB No. Used Action   SHOULDER PLATING SYSTEM TRAY, 5 HOLE PLATE      Left 1 Implanted   SHOULDER PLATING SYSTEM TRAY, 3.5 X 20 SCREW      Left 2 Implanted   SHOULDER PLATING SYSTEM TRAY, 3.5 X 30      Left 1 Implanted   SHOULDER PLATING SYSTEM TRAY, 3.5 X 36       Left 2 Implanted   SHOULDER PLATING SYSTEM TRAY, 3.5 X 30      Left 1 Implanted   SHOULDER PLATING SYSTEM TRAY, 3.5X 24      Left 1 Implanted   SHOULDER PLATING SYSTEM TRAY, 3.5 X 32      Left 1 Implanted   SHOULDER PLATING SYSTEM TRAY, 3.5 X 38      Left 1 Implanted   SHOULDER PLATING SYSTEM TRAY, 3.5 X 40      Left 2 Implanted   SHOULDER PLATING SYSTEM TRAY, 3.5 X 40      Left 2 Implanted   SHOULDER PLATING SYSTEM TRAY, 3.5 X20      Left 2 Implanted   SHOULDER PLATING SYSTEM TRAY, 3.5 X22      Left 2 Implanted   SHOULDER PLATING SYSTEM TRAY, 3.5 X 20      Left 1 Implanted   CHIPS CRUSH CANCELLOUS 15ML - [id number]  CHIPS CRUSH CANCELLOUS 15ML [id number]   Left 1 Implanted       Specimens:   Specimen (24h ago, onward)            None                  Condition: Good    Disposition: PACU - hemodynamically stable.    Attestation: I was present and scrubbed for the entire procedure.    Discharge Note    OUTCOME: Patient tolerated treatment/procedure well without complication and is now ready for discharge.    DISPOSITION: Home or Self Care    FINAL DIAGNOSIS:  Closed fracture of left proximal humerus    FOLLOWUP: In clinic    DISCHARGE INSTRUCTIONS:  No discharge procedures on file.

## 2022-05-10 NOTE — ANESTHESIA PROCEDURE NOTES
Peripheral Block    Patient location during procedure: pre-op   Block not for primary anesthetic.  Reason for block: at surgeon's request and post-op pain management   Post-op Pain Location: L Intersclene      Staffing  Authorizing Provider: Angel Hucthins MD  Performing Provider: Thanh Donaldson MD    Preanesthetic Checklist  Completed: patient identified, IV checked, site marked, risks and benefits discussed, surgical consent, monitors and equipment checked, pre-op evaluation and timeout performed  Peripheral Block  Patient position: sitting  Prep: ChloraPrep  Patient monitoring: heart rate, cardiac monitor, continuous pulse ox, continuous capnometry and frequent blood pressure checks  Block type: interscalene  Laterality: left  Injection technique: single shot  Needle  Needle type: Stimuplex   Needle gauge: 22 G  Needle length: 2 in  Needle localization: anatomical landmarks and ultrasound guidance   -ultrasound image captured on disc.  Assessment  Injection assessment: negative aspiration, negative parasthesia and local visualized surrounding nerve  Paresthesia pain: none  Heart rate change: no  Slow fractionated injection: yes  Pain Tolerance: comfortable throughout block      Additional Notes  VSS.  [facility] RN monitoring vitals throughout procedure.  Patient tolerated procedure well.

## 2022-05-11 VITALS
BODY MASS INDEX: 23.14 KG/M2 | SYSTOLIC BLOOD PRESSURE: 131 MMHG | DIASTOLIC BLOOD PRESSURE: 80 MMHG | HEIGHT: 65 IN | RESPIRATION RATE: 16 BRPM | OXYGEN SATURATION: 96 % | HEART RATE: 94 BPM | WEIGHT: 138.88 LBS | TEMPERATURE: 98 F

## 2022-05-11 PROBLEM — Z98.890 S/P ORIF (OPEN REDUCTION INTERNAL FIXATION) FRACTURE: Status: ACTIVE | Noted: 2022-05-11

## 2022-05-11 PROBLEM — Z87.81 S/P ORIF (OPEN REDUCTION INTERNAL FIXATION) FRACTURE: Status: ACTIVE | Noted: 2022-05-11

## 2022-05-11 PROBLEM — S42.202A CLOSED FRACTURE OF LEFT PROXIMAL HUMERUS: Status: RESOLVED | Noted: 2022-05-10 | Resolved: 2022-05-11

## 2022-05-11 LAB
BASOPHILS # BLD AUTO: 0.03 K/UL (ref 0–0.2)
BASOPHILS NFR BLD: 0.2 % (ref 0–1.9)
DIFFERENTIAL METHOD: ABNORMAL
EOSINOPHIL # BLD AUTO: 0 K/UL (ref 0–0.5)
EOSINOPHIL NFR BLD: 0 % (ref 0–8)
ERYTHROCYTE [DISTWIDTH] IN BLOOD BY AUTOMATED COUNT: 12.6 % (ref 11.5–14.5)
HCT VFR BLD AUTO: 32 % (ref 37–48.5)
HGB BLD-MCNC: 10.7 G/DL (ref 12–16)
IMM GRANULOCYTES # BLD AUTO: 0.06 K/UL (ref 0–0.04)
IMM GRANULOCYTES NFR BLD AUTO: 0.5 % (ref 0–0.5)
LYMPHOCYTES # BLD AUTO: 0.6 K/UL (ref 1–4.8)
LYMPHOCYTES NFR BLD: 4.6 % (ref 18–48)
MCH RBC QN AUTO: 33.4 PG (ref 27–31)
MCHC RBC AUTO-ENTMCNC: 33.4 G/DL (ref 32–36)
MCV RBC AUTO: 100 FL (ref 82–98)
MONOCYTES # BLD AUTO: 1 K/UL (ref 0.3–1)
MONOCYTES NFR BLD: 8.4 % (ref 4–15)
NEUTROPHILS # BLD AUTO: 10.5 K/UL (ref 1.8–7.7)
NEUTROPHILS NFR BLD: 86.3 % (ref 38–73)
NRBC BLD-RTO: 0 /100 WBC
PLATELET # BLD AUTO: 306 K/UL (ref 150–450)
PMV BLD AUTO: 10.3 FL (ref 9.2–12.9)
RBC # BLD AUTO: 3.2 M/UL (ref 4–5.4)
WBC # BLD AUTO: 12.19 K/UL (ref 3.9–12.7)

## 2022-05-11 PROCEDURE — 94761 N-INVAS EAR/PLS OXIMETRY MLT: CPT

## 2022-05-11 PROCEDURE — 97162 PT EVAL MOD COMPLEX 30 MIN: CPT

## 2022-05-11 PROCEDURE — 63600175 PHARM REV CODE 636 W HCPCS: Performed by: ORTHOPAEDIC SURGERY

## 2022-05-11 PROCEDURE — 36415 COLL VENOUS BLD VENIPUNCTURE: CPT | Performed by: ORTHOPAEDIC SURGERY

## 2022-05-11 PROCEDURE — 85025 COMPLETE CBC W/AUTO DIFF WBC: CPT | Performed by: ORTHOPAEDIC SURGERY

## 2022-05-11 PROCEDURE — 25000003 PHARM REV CODE 250: Performed by: ORTHOPAEDIC SURGERY

## 2022-05-11 PROCEDURE — 97165 OT EVAL LOW COMPLEX 30 MIN: CPT

## 2022-05-11 RX ORDER — OXYCODONE AND ACETAMINOPHEN 5; 325 MG/1; MG/1
1 TABLET ORAL EVERY 6 HOURS PRN
Qty: 28 TABLET | Refills: 0 | Status: SHIPPED | OUTPATIENT
Start: 2022-05-11 | End: 2022-05-23

## 2022-05-11 RX ORDER — DOCUSATE SODIUM 100 MG/1
100 CAPSULE, LIQUID FILLED ORAL 2 TIMES DAILY
Refills: 0
Start: 2022-05-11

## 2022-05-11 RX ADMIN — CEFAZOLIN SODIUM 2 G: 2 SOLUTION INTRAVENOUS at 06:05

## 2022-05-11 RX ADMIN — OXYCODONE HYDROCHLORIDE AND ACETAMINOPHEN 1 TABLET: 5; 325 TABLET ORAL at 08:05

## 2022-05-11 RX ADMIN — DOCUSATE SODIUM 100 MG: 100 CAPSULE, LIQUID FILLED ORAL at 08:05

## 2022-05-11 RX ADMIN — OXYCODONE HYDROCHLORIDE AND ACETAMINOPHEN 1 TABLET: 5; 325 TABLET ORAL at 01:05

## 2022-05-11 NOTE — PT/OT/SLP EVAL
"Physical Therapy Evaluation    Patient Name:  Nancy Thompson   MRN:  [id number]    Recommendations:     Discharge Recommendations:  home   Discharge Equipment Recommendations: shower chair   Barriers to discharge: None    Assessment:     Nancy Thompson is a 61 y.o. female admitted with a medical diagnosis of Closed fracture of left proximal humerus.  She presents with the following impairments/functional limitations:  pain, impaired skin, orthopedic precautions, impaired self care skills, impaired functional mobilty, decreased ROM.  Patient seen for physical therapy evaluation.  Patient at Modified independent level with all mobility and maintains NWB precautions with mobility.  Patient is safe for return home with family.  Patient to follow up with outpatient PT when medically ready per Dr. Bell.  Recommend a shower chair for home use.      Rehab Prognosis: Good;    1 Day Post-Op    Plan:   · Discharge PT    Subjective     Chief Complaint: "Ok - I'll get up"  Patient/Family Comments/goals: To return back home  Pain/Comfort:  · Pain Rating 1: 1/10  · Location - Side 1: Left  · Location 1: arm  · Pain Addressed 1: Pre-medicate for activity, Reposition, Distraction, Cessation of Activity, Nurse notified  · Pain Rating Post-Intervention 1: 1/10    Patients cultural, spiritual, Restoration conflicts given the current situation: no    Living Environment:  Patient lives with spouse in a 1 SH, no BRAEDEN, WIS.    Prior to admission, patients level of function was independent with gait and ADLs, +DRiving.  Equipment used at home: none.  DME owned (not currently used): none.  Upon discharge, patient will have assistance from family.    Objective:     Communicated with nurse prior to session.  Patient found supine with peripheral IV, bed alarm  upon PT entry to room.    General Precautions: Standard, fall   Orthopedic Precautions:LUE non weight bearing   Braces: UE Sling  Respiratory Status: Room " air    Exams:  · Cognitive Exam:  Patient is oriented to Person, Place, Time and Situation  · Gross Motor Coordination:  WFL, limited at L UE  · Postural Exam:  Patient presented with the following abnormalities:    · -       Rounded shoulders  · -       Forward head  · Sensation:  Numbness from nerve block on L shoulder to lower arm.    · Skin Integrity/Edema:      · -       Skin integrity: Dressing intact  · RLE ROM: WFL  · RLE Strength: WFL  · LLE ROM: WFL  · LLE Strength: WFL    Functional Mobility:  · Bed Mobility:     · Scooting: independence  · Supine to Sit: independence  · Sit to Supine: independence  · Transfers:     · Sit to Stand:  modified independence with no AD  · Gait: with no AD, sling donned x 150' with no LOB, slow pace, slightly wide GASTON  · Balance: No LOB for standing balance, slightly wide GASTON    Therapeutic Activities and Exercises:   Educated on role of physical therapy.  Educated on NWB L UE precautions and gentle ROM to L hand/wrist/elbow. Shoulder with nerve block numbness continuing, gentle shoulder ROM allowed once nerve block wears off    AM-PAC 6 CLICK MOBILITY  Total Score:23     Patient left HOB elevated with call button in reach, bed alarm on and nurse notified.    GOALS:   Multidisciplinary Problems     Physical Therapy Goals        Problem: Physical Therapy    Goal Priority Disciplines Outcome Goal Variances Interventions   Physical Therapy Goal     PT, PT/OT Adequate for Care Transition                     History:     Past Medical History:   Diagnosis Date    Hypertension     Kidney problem        Past Surgical History:   Procedure Laterality Date     SECTION, CLASSIC      HYSTERECTOMY      KIDNEY SURGERY         Time Tracking:     PT Received On: 22  PT Start Time: 1101     PT Stop Time: 1116  PT Total Time (min): 15 min     Billable Minutes: Evaluation 7      2022

## 2022-05-11 NOTE — PLAN OF CARE
"OT kemi performed this date with PT. Recommending OP PT when cleared by MD. Pt secure chat with Dr. Bell, "gentle ROM left shoulder elbow and hand. No weight bearing on left arm". Pt educated on precautions. Pt performing bed mobility & functional mobility in room/hallway with Hiram & no AD. Pt to d/c to home with family this date. Recommending SC upon d/c.     Problem: Occupational Therapy  Goal: Occupational Therapy Goal  Outcome: Adequate for Care Transition     "

## 2022-05-11 NOTE — PLAN OF CARE
D/c orders noted, no DME, no HH.     RN will go over follow up appointments and medications with pt and pt's  Nura.     Pt's  Nura will transport pt home at the time of d/c.     Pt is cleared to go from CM standpoint.     Future Appointments   Date Time Provider Department Center   5/19/2022  8:30 AM See Osorio PA-C [facility]         05/11/22 1437   Final Note   Assessment Type Final Discharge Note   Anticipated Discharge Disposition Home   What phone number can be called within the next 1-3 days to see how you are doing after discharge? [phone]   Hospital Resources/Appts/Education Provided Appointments scheduled by Navigator/Coordinator   Post-Acute Status   Coverage Medicaid   Discharge Delays None known at this time

## 2022-05-11 NOTE — PT/OT/SLP EVAL
"Occupational Therapy   Evaluation and Discharge Note    Name: Nancy Thompson  MRN: [id number]  Admitting Diagnosis:  Closed fracture of left proximal humerus   Recent Surgery: Procedure(s) (LRB):  ORIF, SHOULDER (Left) 1 Day Post-Op    Recommendations:     Discharge Recommendations: home, outpatient PT (OP PT when cleared by MD)  Discharge Equipment Recommendations:  shower chair  Barriers to discharge:  None    Assessment:     Nancy Thompson is a 61 y.o. female with a medical diagnosis of Closed fracture of left proximal humerus. At this time, patient is functioning at their prior level of function and does not require further acute OT services.     OT eval performed this date with PT. Recommending OP PT when cleared by MD. Pt secure chat with Dr. Bell, "gentle ROM left shoulder elbow and hand. No weight bearing on left arm". Pt educated on precautions. Pt performing bed mobility & functional mobility in room/hallway with Hiram & no AD. Pt to d/c to home with family this date. Recommending SC upon d/c.     Plan:     During this hospitalization, patient does not require further acute OT services.  Please re-consult if situation changes.    · Plan of Care Reviewed with: patient, family    Subjective     Chief Complaint: Slight pain L arm  Patient/Family Comments/goals: Return home    Occupational Profile:  Patient lives with spouse in a 1 SH, no BRAEDEN, WIS.    Prior to admission, patients level of function was independent with gait and ADLs, pt drives.    Equipment used at home: none.   Upon discharge, patient will have assistance from family.    Pain/Comfort:  · Pain Rating 1: 1/10  · Location - Side 1: Left  · Location 1: arm  · Pain Addressed 1: Pre-medicate for activity, Reposition, Distraction, Cessation of Activity, Nurse notified  · Pain Rating Post-Intervention 1: 1/10    Patients cultural, spiritual, Hindu conflicts given the current situation: no    Objective:     Communicated with: roberta " "prior to session.  Patient found HOB elevated with peripheral IV upon OT entry to room.    General Precautions: Standard, fall   Orthopedic Precautions:LUE non weight bearing   Braces: UE Sling  Respiratory Status: Room air     Occupational Performance:    Bed Mobility:    · Patient completed Scooting/Bridging with modified independence  · Patient completed Supine to Sit with modified independence    Functional Mobility/Transfers:  · Patient completed Sit <> Stand Transfer with modified independence  with  no assistive device   · Patient completed Toilet Transfer Step Transfer technique with supervision with  no AD  · Functional Mobility: Pt performing functional mobility in room/hallway with Sup and no AD, decreased gait speed noted    Activities of Daily Living:  · Toileting: supervision on toilet for hygiene    Cognitive/Visual Perceptual:  Cognitive/Psychosocial Skills:     -       Oriented to: Person, Place, Time and Situation   -       Follows Commands/attention:Follows multistep  commands  -       Memory: No Deficits noted  -       Mood/Affect/Coping skills/emotional control: Cooperative and Pleasant    Physical Exam:  Skin integrity: Bruising of LUE  Edema:  LUE  Sensation:    -       Impaired  light/touch LUE, pt reports sensation has not fully returned after sx, light touch intact in L hand, decreased light touch L forearm, pt reports no light touch in upper arm/shoulder at this time. Numbness from nerve block on L shoulder to lower arm  Upper Extremity Range of Motion:     -       Right Upper Extremity: WFL  Upper Extremity Strength:    -       Right Upper Extremity: 4/5   Strength:    -       Right Upper Extremity: WFL    AMPAC 6 Click ADL:  AMPAC Total Score: 24    Treatment & Education:  Pt secure chat with Dr. Bell, "gentle ROM left shoulder elbow and hand. No weight bearing on left arm". Pt educated on wrist & hand ROM, shoulder ROM not performed at this time 2/2 decreased sensation. Pt " educated on wearing of UE sling.    Pt educated on all precautions & verbalizing good understanding.   Pt performing bed mobility & functional mobility in room/hallway with Hiram/Sup & no AD.   Pt performing toileting as above.   Pt to d/c to home with family this date.   Recommending SC upon d/c.   Education:    Patient left HOB elevated with all lines intact, call button in reach, nsg notified and family present    GOALS:   Multidisciplinary Problems     Occupational Therapy Goals        Problem: Occupational Therapy    Goal Priority Disciplines Outcome Interventions   Occupational Therapy Goal     OT, PT/OT Adequate for Care Transition                    History:     Past Medical History:   Diagnosis Date    Hypertension     Kidney problem        Past Surgical History:   Procedure Laterality Date     SECTION, CLASSIC      HYSTERECTOMY      KIDNEY SURGERY         Time Tracking:     OT Date of Treatment: 22  OT Start Time: 1102  OT Stop Time: 1117  OT Total Time (min): 15 min    Billable Minutes:Evaluation 8    2022

## 2022-05-11 NOTE — ANESTHESIA POSTPROCEDURE EVALUATION
Anesthesia Post Evaluation    Patient: Nancy Thompson    Procedure(s) Performed: Procedure(s) (LRB):  ORIF, SHOULDER (Left)    Final Anesthesia Type: general      Patient location during evaluation: PACU  Patient participation: Yes- Able to Participate  Level of consciousness: awake and alert  Post-procedure vital signs: reviewed and stable  Pain management: adequate  Airway patency: patent  KUSHAL mitigation strategies: Multimodal analgesia  PONV status at discharge: No PONV  Anesthetic complications: no      Cardiovascular status: hemodynamically stable and blood pressure returned to baseline  Respiratory status: room air, unassisted and spontaneous ventilation  Hydration status: euvolemic  Follow-up not needed.          Vitals Value Taken Time   /78 05/10/22 1951   Temp 36.4 °C (97.5 °F) 05/10/22 1951   Pulse 90 05/10/22 2008   Resp 16 05/10/22 2008   SpO2 96 % 05/10/22 2008         Event Time   Out of Recovery 18:34:20         Pain/Vidal Score: Vidal Score: 10 (5/10/2022  6:30 PM)

## 2022-05-11 NOTE — PLAN OF CARE
Rosa met with pt and pt's  Nura [phone] to discuss d/c planning. Pt lives with Nura. Pt has no DME and drives herself to doctor appointments. Pt was independent prior to admit. Pt's son Nura Joel will transport pt home at the time of d/c. Rosa encouraged pt and pt's  Nura to call with any questions or concerns. Rosa will continue to follow pt for d/c planning.        05/11/22 5470   Discharge Assessment   Assessment Type Discharge Planning Assessment   Confirmed/corrected address, phone number and insurance Yes   Confirmed Demographics Correct on Facesheet   Source of Information patient;family   Lives With spouse   Facility Arrived From: home   Do you expect to return to your current living situation? Yes   Do you have help at home or someone to help you manage your care at home? No   Prior to hospitilization cognitive status: Alert/Oriented   Current cognitive status: Alert/Oriented   Walking or Climbing Stairs Difficulty none   Dressing/Bathing Difficulty none   Equipment Currently Used at Home   (TBD)   Readmission within 30 days? No   Patient currently being followed by outpatient case management? No   Do you currently have service(s) that help you manage your care at home? No   Do you have prescription coverage? Yes   Coverage Medicaid   Do you have any problems affording any of your prescribed medications? No   Who is going to help you get home at discharge? pt's  Nura [phone]   How do you get to doctors appointments? car, drives self   Discharge Plan A Home with family   Discharge Plan B Home with family   DME Needed Upon Discharge    (TBD)   Discharge Plan discussed with: Patient;Spouse/sig other   Name(s) and Number(s) pt's  Nura [phone]   Discharge Barriers Identified None   Relationship/Environment   Name(s) of Who Lives With Patient pt's  Nura [phone]

## 2022-05-11 NOTE — PLAN OF CARE
05/10/22 2010   Admission   Initial VN Admission Questions Complete   Communication Issues? None   Shift   Virtual Nurse - Rounding Complete   Pain Management Interventions relaxation techniques promoted;quiet environment facilitated   Virtual Nurse - Patient Verbalized Approval Of VN Rounding;Camera Use   Type of Frequent Check   Type Patient Rounds   Safety/Activity   Patient Rounds call light in patient/parent reach;visualized patient;clutter free environment maintained;bed in low position;bed wheels locked;placement of personal items at bedside;ID band on   Safety Promotion/Fall Prevention assistive device/personal item within reach;nonskid shoes/socks when out of bed;side rails raised x 2;room near unit station;instructed to call staff for mobility;medications reviewed;Fall Risk reviewed with patient/family;bed alarm set   Safety Precautions emergency equipment at bedside   Positioning   Head of Bed (HOB) Positioning HOB elevated   Positioning/Transfer Devices pillows;in use   Pain/Comfort/Sleep   Sleep/Rest/Relaxation no problem identified   VN cued into room to complete admit assessment. VIP model introduced; VN working alongside bedside treatment team.  Plan of care reviewed with patient. Patient informed of fall risk, fall precautions, call light within reach, side rails x2 elevated. Patient notified to ask staff for assistance. Patient verbalized complete understanding. Time allowed for questions. Will continue to monitor and intervene as needed.

## 2022-05-11 NOTE — PLAN OF CARE
VN note: VN completed AVS and attachments and notified bedside nurseNohemi. Will cont to be available and intervene prn.

## 2022-05-11 NOTE — PLAN OF CARE
Problem: Physical Therapy  Goal: Physical Therapy Goal  Outcome: Adequate for Care Transition       Patient seen for physical therapy evaluation.  Patient at Modified independent level with all mobility and maintains NWB precautions with mobility.  Patient is safe for return home with family.  Patient to follow up with outpatient PT when medically ready per Dr. Bell.  Recommend a shower chair for home use.

## 2022-05-11 NOTE — PROGRESS NOTES
[facility]                    Pharmacy       Discharge Medication Education    Patient ACCEPTED medication education. Pharmacy has provided education on the name, indication, and possible side effects of the medication(s) prescribed, using teach-back method.     The following medications have also been discussed, during this admission.        Medication List        START taking these medications      docusate sodium 100 MG capsule  Commonly known as: COLACE  Take 1 capsule (100 mg total) by mouth 2 (two) times daily.     oxyCODONE-acetaminophen 5-325 mg per tablet  Commonly known as: PERCOCET  Take 1 tablet by mouth every 6 (six) hours as needed for Pain. Fill this prescription first            CONTINUE taking these medications      ibuprofen 800 MG tablet  Commonly known as: ADVIL,MOTRIN  Take 1 tablet (800 mg total) by mouth 3 (three) times daily after meals.               Where to Get Your Medications        You can get these medications from any pharmacy    Bring a paper prescription for each of these medications  oxyCODONE-acetaminophen 5-325 mg per tablet       Information about where to get these medications is not yet available    Ask your nurse or doctor about these medications  docusate sodium 100 MG capsule          Thank you  Russell Suazo PharmD

## 2022-05-11 NOTE — DISCHARGE INSTRUCTIONS
GENERAL DISCHARGE INSTRUCTIONS:    PAIN MANAGEMENT:  Take your pain medication as prescribed.  Wean yourself off narcotic pain medication slowly, supplementing with acetominophen (Tylenol).  Do not exceed 3000 mg of Tylenol per day.  You may also substitute other over-the-counter pain medications unless your allergic or have been instructed not to do so by your doctor.    TO PREVENT CONSTIPATION:  continue to take stool softeners regularly for as long as you are on narcotic pain medication (oxycodone/hydrocodone). If you do not have normal bowel movement for 1-2 days, purchase an over the counter laxative, such as Milk of Magnesia, and follow the instructions on the label. Call your doctor for severe abdominal pain, vomiting or diarrhea.    NOTIFY YOUR SURGEON IF: Unrelenting pain that does not improve, even after taking prescribed pain medication. Increasing drainage from the wound.

## 2022-05-11 NOTE — PLAN OF CARE
AVS and educational attachments shared with patient and  via [employer]. Discussed thoroughly. Patient and  verbalized clear understanding using teach back method. Notified bedside nurse of completion of education. At present no distress noted. Patient to be discharged via w/c with escort service and family with all of patient's belonings. Will cont to be available to patient and family and intervene prn.

## 2022-05-12 NOTE — DISCHARGE SUMMARY
[facility]  Orthopedics  Discharge Summary      Patient Name: Nancy Thompson  MRN: [id number]  Admission Date: 5/10/2022  Hospital Length of Stay: 1 days  Discharge Date and Time:  05/12/2022 12:00 PM  Attending Physician: No att. providers found   Discharging Provider: Jennifer Dean PA-C  Primary Care Provider: Primary Doctor Jayla    HPI: Nancy Thompson is a 61 y.o. female with h/o significant for HTN and proximal humerus fracture. She was admitted for ORIF left humerus.     Procedure(s) (LRB):  ORIF, SHOULDER (Left)      Hospital Course: The patient underwent surgery on the day of admission. The procedure was uneventful and the patient tolerated well. Post operatively the patient was hemodynamically stable. There were no evident acute postoperative complications.          Significant Diagnostic Studies: Labs:   BMP:   Recent Labs   Lab 05/10/22  1211   GLU 96      K 4.6      CO2 26   BUN 9   CREATININE 0.7   CALCIUM 10.3    and CMP   Recent Labs   Lab 05/10/22  1211      K 4.6      CO2 26   GLU 96   BUN 9   CREATININE 0.7   CALCIUM 10.3   ANIONGAP 13   ESTGFRAFRICA >60   EGFRNONAA >60       Pending Diagnostic Studies:     None        Final Active Diagnoses:    Diagnosis Date Noted POA    S/P ORIF (open reduction internal fixation) fracture [Z98.890, Z87.81] 05/11/2022 Not Applicable      Problems Resolved During this Admission:    Diagnosis Date Noted Date Resolved POA    PRINCIPAL PROBLEM:  Closed fracture of left proximal humerus [S42.202A] 05/10/2022 05/11/2022 Yes      Discharged Condition: fair    Disposition: Home or Self Care    Follow Up:   Follow-up Information     Fred Bell Jr, MD Follow up.    Specialties: Hand Surgery, Orthopedic Surgery  Contact information:  [address] [id number] [phone]                       Patient Instructions:      Diet Adult Regular     Other restrictions (specify):   Order Comments: No lifting      Notify your health care provider if you experience any of the following:  severe uncontrolled pain     Notify your health care provider if you experience any of the following:  redness, tenderness, or signs of infection (pain, swelling, redness, odor or green/yellow discharge around incision site)     Notify your health care provider if you experience any of the following:  difficulty breathing or increased cough      Remove dressing in 72 hours     Shower on day dressing removed (No bath)     Medications:  Reconciled Home Medications:      Medication List      START taking these medications    docusate sodium 100 MG capsule  Commonly known as: COLACE  Take 1 capsule (100 mg total) by mouth 2 (two) times daily.     oxyCODONE-acetaminophen 5-325 mg per tablet  Commonly known as: PERCOCET  Take 1 tablet by mouth every 6 (six) hours as needed for Pain. Fill this prescription first        CONTINUE taking these medications    ibuprofen 800 MG tablet  Commonly known as: ADVIL,MOTRIN  Take 1 tablet (800 mg total) by mouth 3 (three) times daily after meals.            Jennifer Dean PA-C  Orthopedics  [facility]

## 2022-05-19 ENCOUNTER — TELEPHONE (OUTPATIENT)
Dept: ORTHOPEDICS | Facility: CLINIC | Age: 62
End: 2022-05-19
Payer: MEDICAID

## 2022-05-19 NOTE — TELEPHONE ENCOUNTER
----- Message from Jennifer Dean PA-C sent at 5/19/2022  8:24 AM CDT -----  Can you please get this pt a PO visit with either, myself, rebekah or melita  She is 9 days out already   Please have her come in before 14 days

## 2022-05-20 ENCOUNTER — TELEPHONE (OUTPATIENT)
Dept: ORTHOPEDICS | Facility: CLINIC | Age: 62
End: 2022-05-20
Payer: MEDICAID

## 2022-05-20 NOTE — PROGRESS NOTES
"Subjective:      Patient ID: Nancy Thompson is a 61 y.o. female.    Chief Complaint: No chief complaint on file.      YANNICK  (French)    She is here for a postop visit. She is s/p Open reduction internal fixation of left proximal humerus 3 part fracture using Brule locking plate by Dr. Bell on 5/10/22.     She is doing well with minimal pain in left shoulder. She is in sling. No numbness or tingling. Not taking percocet- made her feel bad.       Past Medical History:   Diagnosis Date    Hypertension     Kidney problem          Current Outpatient Medications:     docusate sodium (COLACE) 100 MG capsule, Take 1 capsule (100 mg total) by mouth 2 (two) times daily. (Patient not taking: Reported on 5/23/2022), Disp: , Rfl: 0    ibuprofen (ADVIL,MOTRIN) 800 MG tablet, Take 1 tablet (800 mg total) by mouth 3 (three) times daily after meals. (Patient not taking: Reported on 5/23/2022), Disp: 90 tablet, Rfl: 2    traMADoL (ULTRAM) 50 mg tablet, Take 1 tablet (50 mg total) by mouth every 8 (eight) hours as needed for Pain., Disp: 21 tablet, Rfl: 0    Review of patient's allergies indicates:   Allergen Reactions    Hydrocodone Nausea And Vomiting       Review of Systems   Constitutional: Negative for chills, fever, night sweats and weight gain.   Gastrointestinal: Negative for bowel incontinence, nausea and vomiting.   Genitourinary: Negative for bladder incontinence.   Neurological: Negative for disturbances in coordination and loss of balance.         Objective:        Ht 5' 5" (1.651 m)   Wt 62.2 kg (137 lb 3.2 oz)   BMI 22.83 kg/m²     Ortho/SPM Exam    On exam of left shoulder:   Incision is clean and dry with steri strips intact.   No signs of infection.     She cannot extend the elbow.   Compartments are soft.     She is NVI distally with good capillary refill.         Assessment:       Encounter Diagnoses   Name Primary?    Closed fracture of head of left humerus, sequela Yes    Elective surgery     "       Plan:       Diagnoses and all orders for this visit:    Closed fracture of head of left humerus, sequela  -     traMADoL (ULTRAM) 50 mg tablet; Take 1 tablet (50 mg total) by mouth every 8 (eight) hours as needed for Pain.  -     Ambulatory referral/consult to Physical/Occupational Therapy; Future    Elective surgery  -     traMADoL (ULTRAM) 50 mg tablet; Take 1 tablet (50 mg total) by mouth every 8 (eight) hours as needed for Pain.  -     Ambulatory referral/consult to Physical/Occupational Therapy; Future      She is doing well s/p above surgery. Following plan made:     - Reviewed wound care.   - Start OT for left shoulder. Orders to [facility]. Go slow per Dr. Bell.    - Wean out of sling. No lifting with left arm. Work on straightening out left elbow.   - New prescription for ultram for severe pain. Reviewed dosing and side effects.  reviewed and is appropriate.   - Follow up with Dr. Bell in 4-5 weeks. Staff to call with appointment.     Follow up in about 4 weeks (around 6/20/2022) for postop visit with Dr. Bell.

## 2022-05-20 NOTE — TELEPHONE ENCOUNTER
LVM informing pt of Post Op appointment with Cori Smith on 05/23 @ 10:00. Left return number if date or time does not work  for her.

## 2022-05-23 ENCOUNTER — TELEPHONE (OUTPATIENT)
Dept: ORTHOPEDICS | Facility: CLINIC | Age: 62
End: 2022-05-23
Payer: MEDICAID

## 2022-05-23 ENCOUNTER — OFFICE VISIT (OUTPATIENT)
Dept: ORTHOPEDICS | Facility: CLINIC | Age: 62
End: 2022-05-23
Payer: MEDICAID

## 2022-05-23 VITALS — HEIGHT: 65 IN | BODY MASS INDEX: 22.86 KG/M2 | WEIGHT: 137.19 LBS

## 2022-05-23 DIAGNOSIS — Z41.9 ELECTIVE SURGERY: ICD-10-CM

## 2022-05-23 DIAGNOSIS — S42.292S CLOSED FRACTURE OF HEAD OF LEFT HUMERUS, SEQUELA: Primary | ICD-10-CM

## 2022-05-23 PROCEDURE — 99999 PR PBB SHADOW E&M-EST. PATIENT-LVL III: CPT | Mod: PBBFAC,,, | Performed by: PHYSICIAN ASSISTANT

## 2022-05-23 PROCEDURE — 1159F PR MEDICATION LIST DOCUMENTED IN MEDICAL RECORD: ICD-10-PCS | Mod: CPTII,,, | Performed by: PHYSICIAN ASSISTANT

## 2022-05-23 PROCEDURE — 99999 PR PBB SHADOW E&M-EST. PATIENT-LVL III: ICD-10-PCS | Mod: PBBFAC,,, | Performed by: PHYSICIAN ASSISTANT

## 2022-05-23 PROCEDURE — 1159F MED LIST DOCD IN RCRD: CPT | Mod: CPTII,,, | Performed by: PHYSICIAN ASSISTANT

## 2022-05-23 PROCEDURE — 99024 POSTOP FOLLOW-UP VISIT: CPT | Mod: ,,, | Performed by: PHYSICIAN ASSISTANT

## 2022-05-23 PROCEDURE — 99213 OFFICE O/P EST LOW 20 MIN: CPT | Mod: PBBFAC,PN | Performed by: PHYSICIAN ASSISTANT

## 2022-05-23 PROCEDURE — 3008F BODY MASS INDEX DOCD: CPT | Mod: CPTII,,, | Performed by: PHYSICIAN ASSISTANT

## 2022-05-23 PROCEDURE — 1160F PR REVIEW ALL MEDS BY PRESCRIBER/CLIN PHARMACIST DOCUMENTED: ICD-10-PCS | Mod: CPTII,,, | Performed by: PHYSICIAN ASSISTANT

## 2022-05-23 PROCEDURE — 3008F PR BODY MASS INDEX (BMI) DOCUMENTED: ICD-10-PCS | Mod: CPTII,,, | Performed by: PHYSICIAN ASSISTANT

## 2022-05-23 PROCEDURE — 99024 PR POST-OP FOLLOW-UP VISIT: ICD-10-PCS | Mod: ,,, | Performed by: PHYSICIAN ASSISTANT

## 2022-05-23 PROCEDURE — 1160F RVW MEDS BY RX/DR IN RCRD: CPT | Mod: CPTII,,, | Performed by: PHYSICIAN ASSISTANT

## 2022-05-23 RX ORDER — TRAMADOL HYDROCHLORIDE 50 MG/1
50 TABLET ORAL EVERY 8 HOURS PRN
Qty: 21 TABLET | Refills: 0 | Status: SHIPPED | OUTPATIENT
Start: 2022-05-23 | End: 2022-05-30

## 2022-05-23 NOTE — TELEPHONE ENCOUNTER
----- Message from Candie Hale MA sent at 5/23/2022 10:15 AM CDT -----  Pt needs a F/U with [place] in about 6/8 wks for Post Op shoulder.  Can you please assist with scheduling this.   Thanks!  -Candie

## 2022-05-23 NOTE — PATIENT INSTRUCTIONS
It was nice to see you again.     You can wean out of sling and work on trying to straighten your elbow. You can do things out in front of you. No lifting with left arm.     Okay to shower and get incision wet. Strips will fall off when ready.     I sent tramadol to your pharmacy to help with severe pain. It can make you sleepy and/or constipated.     I sent therapy orders to [facility]. They should call you. If not, you can call [phone].     Dr. Bell will see you back in 4-5 weeks. His staff will call with appointment.     Call me if you need anything.     Cori   [phone]

## 2022-05-27 ENCOUNTER — TELEPHONE (OUTPATIENT)
Dept: FAMILY MEDICINE | Facility: CLINIC | Age: 62
End: 2022-05-27
Payer: MEDICAID

## 2022-06-02 PROBLEM — M25.412 PAIN AND SWELLING OF LEFT SHOULDER: Status: ACTIVE | Noted: 2022-06-02

## 2022-06-02 PROBLEM — M25.512 PAIN AND SWELLING OF LEFT SHOULDER: Status: ACTIVE | Noted: 2022-06-02

## 2022-06-02 PROBLEM — R53.1 WEAKNESS: Status: ACTIVE | Noted: 2022-06-02

## 2022-07-27 ENCOUNTER — OFFICE VISIT (OUTPATIENT)
Dept: ORTHOPEDICS | Facility: CLINIC | Age: 62
End: 2022-07-27
Payer: MEDICAID

## 2022-07-27 VITALS — HEIGHT: 65 IN | WEIGHT: 137 LBS | BODY MASS INDEX: 22.82 KG/M2

## 2022-07-27 DIAGNOSIS — Z87.81 S/P ORIF (OPEN REDUCTION INTERNAL FIXATION) FRACTURE: ICD-10-CM

## 2022-07-27 DIAGNOSIS — Z98.890 S/P ORIF (OPEN REDUCTION INTERNAL FIXATION) FRACTURE: ICD-10-CM

## 2022-07-27 DIAGNOSIS — M25.512 LEFT SHOULDER PAIN, UNSPECIFIED CHRONICITY: Primary | ICD-10-CM

## 2022-07-27 PROCEDURE — 3008F PR BODY MASS INDEX (BMI) DOCUMENTED: ICD-10-PCS | Mod: CPTII,,, | Performed by: ORTHOPAEDIC SURGERY

## 2022-07-27 PROCEDURE — 99999 PR PBB SHADOW E&M-EST. PATIENT-LVL III: ICD-10-PCS | Mod: PBBFAC,,, | Performed by: ORTHOPAEDIC SURGERY

## 2022-07-27 PROCEDURE — 99999 PR PBB SHADOW E&M-EST. PATIENT-LVL III: CPT | Mod: PBBFAC,,, | Performed by: ORTHOPAEDIC SURGERY

## 2022-07-27 PROCEDURE — 3008F BODY MASS INDEX DOCD: CPT | Mod: CPTII,,, | Performed by: ORTHOPAEDIC SURGERY

## 2022-07-27 PROCEDURE — 99024 PR POST-OP FOLLOW-UP VISIT: ICD-10-PCS | Mod: ,,, | Performed by: ORTHOPAEDIC SURGERY

## 2022-07-27 PROCEDURE — 1159F MED LIST DOCD IN RCRD: CPT | Mod: CPTII,,, | Performed by: ORTHOPAEDIC SURGERY

## 2022-07-27 PROCEDURE — 99213 OFFICE O/P EST LOW 20 MIN: CPT | Mod: PBBFAC,PN | Performed by: ORTHOPAEDIC SURGERY

## 2022-07-27 PROCEDURE — 1159F PR MEDICATION LIST DOCUMENTED IN MEDICAL RECORD: ICD-10-PCS | Mod: CPTII,,, | Performed by: ORTHOPAEDIC SURGERY

## 2022-07-27 PROCEDURE — 99024 POSTOP FOLLOW-UP VISIT: CPT | Mod: ,,, | Performed by: ORTHOPAEDIC SURGERY

## 2022-07-27 RX ORDER — MELOXICAM 15 MG/1
15 TABLET ORAL DAILY
Qty: 30 TABLET | Refills: 1 | Status: SHIPPED | OUTPATIENT
Start: 2022-07-27 | End: 2022-08-26

## 2022-07-27 NOTE — PROGRESS NOTES
"Subjective:      Patient ID: Nancy Thompson is a 61 y.o. female.  Chief Complaint: Post-op Evaluation (Left shoulder ( Sx 5/10))      HPI  Nancy Thompson is a  61 y.o. female presenting today for post op visit.  She is s/p ORIF left proximal humerus fracture little over 2 months postop she is doing well currently in therapy pain minimal.     Review of patient's allergies indicates:   Allergen Reactions    Hydrocodone Nausea And Vomiting         Current Outpatient Medications   Medication Sig Dispense Refill    docusate sodium (COLACE) 100 MG capsule Take 1 capsule (100 mg total) by mouth 2 (two) times daily. (Patient not taking: No sig reported)  0    ibuprofen (ADVIL,MOTRIN) 800 MG tablet Take 1 tablet (800 mg total) by mouth 3 (three) times daily after meals. (Patient not taking: No sig reported) 90 tablet 2     No current facility-administered medications for this visit.       Past Medical History:   Diagnosis Date    Hypertension     Kidney problem        Past Surgical History:   Procedure Laterality Date     SECTION, CLASSIC      HYSTERECTOMY      KIDNEY SURGERY      OPEN REDUCTION AND INTERNAL FIXATION (ORIF) OF INJURY OF SHOULDER Left 5/10/2022    Procedure: ORIF, SHOULDER;  Surgeon: Fred Bell Jr., MD;  Location: [facility];  Service: Orthopedics;  Laterality: Left;  need theresa proximal humerus plate (See melvin)  C arm, Confirmed 22 AM       OBJECTIVE:   PHYSICAL EXAM:  Height: 5' 5" (165.1 cm) Weight: 62.1 kg (137 lb)  Vitals:    22 1432   Weight: 62.1 kg (137 lb)   Height: 5' 5" (1.651 m)   PainSc:   6   PainLoc: Shoulder     Ortho/SPM Exam  Examination left shoulder no tenderness no swelling  Incision well healed  No evidence of infection  Range of motion improving still lacks full elevation slight weakness neurologic exam intact    RADIOGRAPHS:  AP lateral x-ray left shoulder demonstrate healed fracture proximal humerus hardware good position  Comments: I have " personally reviewed the imaging and I agree with the above radiologist's report.    ASSESSMENT/PLAN:     IMPRESSION:  Status pos ORIF left proximal humerus fracture    PLAN:  Continue therapy  Okay for gentle strengthening left shoulder  Advance activities as tolerated  Mobic ordered for anti-inflammatory effect    FOLLOW UP:  6 weeks    Disclaimer: This note has been generated using voice-recognition software. There may be typographical errors that have been missed during proof-reading.

## 2022-09-07 ENCOUNTER — OFFICE VISIT (OUTPATIENT)
Dept: ORTHOPEDICS | Facility: CLINIC | Age: 62
End: 2022-09-07
Payer: MEDICAID

## 2022-09-07 VITALS — HEIGHT: 65 IN | WEIGHT: 137 LBS | BODY MASS INDEX: 22.82 KG/M2

## 2022-09-07 DIAGNOSIS — Z87.81 S/P ORIF (OPEN REDUCTION INTERNAL FIXATION) FRACTURE: Primary | ICD-10-CM

## 2022-09-07 DIAGNOSIS — Z98.890 S/P ORIF (OPEN REDUCTION INTERNAL FIXATION) FRACTURE: Primary | ICD-10-CM

## 2022-09-07 PROCEDURE — 99999 PR PBB SHADOW E&M-EST. PATIENT-LVL II: ICD-10-PCS | Mod: PBBFAC,,, | Performed by: ORTHOPAEDIC SURGERY

## 2022-09-07 PROCEDURE — 3008F BODY MASS INDEX DOCD: CPT | Mod: CPTII,,, | Performed by: ORTHOPAEDIC SURGERY

## 2022-09-07 PROCEDURE — 99999 PR PBB SHADOW E&M-EST. PATIENT-LVL II: CPT | Mod: PBBFAC,,, | Performed by: ORTHOPAEDIC SURGERY

## 2022-09-07 PROCEDURE — 1159F PR MEDICATION LIST DOCUMENTED IN MEDICAL RECORD: ICD-10-PCS | Mod: CPTII,,, | Performed by: ORTHOPAEDIC SURGERY

## 2022-09-07 PROCEDURE — 1159F MED LIST DOCD IN RCRD: CPT | Mod: CPTII,,, | Performed by: ORTHOPAEDIC SURGERY

## 2022-09-07 PROCEDURE — 99213 PR OFFICE/OUTPT VISIT, EST, LEVL III, 20-29 MIN: ICD-10-PCS | Mod: S$PBB,,, | Performed by: ORTHOPAEDIC SURGERY

## 2022-09-07 PROCEDURE — 3008F PR BODY MASS INDEX (BMI) DOCUMENTED: ICD-10-PCS | Mod: CPTII,,, | Performed by: ORTHOPAEDIC SURGERY

## 2022-09-07 PROCEDURE — 99212 OFFICE O/P EST SF 10 MIN: CPT | Mod: PBBFAC,PN | Performed by: ORTHOPAEDIC SURGERY

## 2022-09-07 PROCEDURE — 99213 OFFICE O/P EST LOW 20 MIN: CPT | Mod: S$PBB,,, | Performed by: ORTHOPAEDIC SURGERY

## 2022-09-07 RX ORDER — MELOXICAM 15 MG/1
15 TABLET ORAL DAILY
Qty: 30 TABLET | Refills: 1 | Status: SHIPPED | OUTPATIENT
Start: 2022-09-07 | End: 2022-10-07

## 2022-09-07 NOTE — PROGRESS NOTES
"Subjective:      Patient ID: Nancy Thompson is a 61 y.o. female.  Chief Complaint: Post-op Evaluation (L Shoulder )      HPI  Nancy Thompson is a  61 y.o. female presenting today for follow up of ORIF left proximal humerus fracture now about 4 months postop   .  She reports that she is just about finished up with physical therapy   Still having some soreness in the shoulder mostly anteriorly seems to be aggravated by therapy a bit   No numbness or tingling reported.    Review of patient's allergies indicates:   Allergen Reactions    Hydrocodone Nausea And Vomiting         Current Outpatient Medications   Medication Sig Dispense Refill    docusate sodium (COLACE) 100 MG capsule Take 1 capsule (100 mg total) by mouth 2 (two) times daily. (Patient not taking: No sig reported)  0    ibuprofen (ADVIL,MOTRIN) 800 MG tablet Take 1 tablet (800 mg total) by mouth 3 (three) times daily after meals. (Patient not taking: No sig reported) 90 tablet 2     No current facility-administered medications for this visit.       Past Medical History:   Diagnosis Date    Hypertension     Kidney problem        Past Surgical History:   Procedure Laterality Date     SECTION, CLASSIC      HYSTERECTOMY      KIDNEY SURGERY      OPEN REDUCTION AND INTERNAL FIXATION (ORIF) OF INJURY OF SHOULDER Left 5/10/2022    Procedure: ORIF, SHOULDER;  Surgeon: Fred Bell Jr., MD;  Location: [facility];  Service: Orthopedics;  Laterality: Left;  need theresa proximal humerus plate (See rep)  C arm, Confirmed 22 AM       OBJECTIVE:   PHYSICAL EXAM:  Height: 5' 5" (165.1 cm) Weight: 62.1 kg (137 lb)  Vitals:    22 1541   Weight: 62.1 kg (137 lb)   Height: 5' 5" (1.651 m)   PainSc:   5     Ortho/SPM Exam  Range of motion slightly decreased especially elevation to about 130 but pain beyond that   No instability internal external rotation is good   Strength a little bit weak Examination left shoulder the incision well healed no " swelling no tenderness no evidence of infection       RADIOGRAPHS:  Previous x-rays show the fracture well healed left shoulder  Comments: I have personally reviewed the imaging and I agree with the above radiologist's report.    ASSESSMENT/PLAN:     IMPRESSION:  Status post ORIF left proximal humerus fracture    PLAN:  I think she may just be overdoing it in terms of shoulder motion and strengthening so I think we should go ahead discontinue formal therapy at this point but continue with the home program   I have also started her back on Mobic and recommended some topical anti-inflammatory cream for the shoulder  Regular activities   Avoid heavy lifting       FOLLOW UP:  6-8 weeks    Disclaimer: This note has been generated using voice-recognition software. There may be typographical errors that have been missed during proof-reading.

## 2022-10-19 ENCOUNTER — OFFICE VISIT (OUTPATIENT)
Dept: ORTHOPEDICS | Facility: CLINIC | Age: 62
End: 2022-10-19
Payer: MEDICAID

## 2022-10-19 VITALS — HEIGHT: 65 IN | BODY MASS INDEX: 22.82 KG/M2 | WEIGHT: 137 LBS

## 2022-10-19 DIAGNOSIS — Z98.890 S/P ORIF (OPEN REDUCTION INTERNAL FIXATION) FRACTURE: Primary | ICD-10-CM

## 2022-10-19 DIAGNOSIS — Z87.81 S/P ORIF (OPEN REDUCTION INTERNAL FIXATION) FRACTURE: Primary | ICD-10-CM

## 2022-10-19 PROCEDURE — 99999 PR PBB SHADOW E&M-EST. PATIENT-LVL II: CPT | Mod: PBBFAC,,, | Performed by: ORTHOPAEDIC SURGERY

## 2022-10-19 PROCEDURE — 99212 OFFICE O/P EST SF 10 MIN: CPT | Mod: PBBFAC,PN,25 | Performed by: ORTHOPAEDIC SURGERY

## 2022-10-19 PROCEDURE — 20610 DRAIN/INJ JOINT/BURSA W/O US: CPT | Mod: S$PBB,LT,, | Performed by: ORTHOPAEDIC SURGERY

## 2022-10-19 PROCEDURE — 20610 DRAIN/INJ JOINT/BURSA W/O US: CPT | Mod: PBBFAC,PN | Performed by: ORTHOPAEDIC SURGERY

## 2022-10-19 PROCEDURE — 1159F MED LIST DOCD IN RCRD: CPT | Mod: CPTII,,, | Performed by: ORTHOPAEDIC SURGERY

## 2022-10-19 PROCEDURE — 99213 OFFICE O/P EST LOW 20 MIN: CPT | Mod: S$PBB,25,, | Performed by: ORTHOPAEDIC SURGERY

## 2022-10-19 PROCEDURE — 99999 PR PBB SHADOW E&M-EST. PATIENT-LVL II: ICD-10-PCS | Mod: PBBFAC,,, | Performed by: ORTHOPAEDIC SURGERY

## 2022-10-19 PROCEDURE — 20610 PR DRAIN/INJECT LARGE JOINT/BURSA: ICD-10-PCS | Mod: S$PBB,LT,, | Performed by: ORTHOPAEDIC SURGERY

## 2022-10-19 PROCEDURE — 1159F PR MEDICATION LIST DOCUMENTED IN MEDICAL RECORD: ICD-10-PCS | Mod: CPTII,,, | Performed by: ORTHOPAEDIC SURGERY

## 2022-10-19 PROCEDURE — 99213 PR OFFICE/OUTPT VISIT, EST, LEVL III, 20-29 MIN: ICD-10-PCS | Mod: S$PBB,25,, | Performed by: ORTHOPAEDIC SURGERY

## 2022-10-19 RX ORDER — DICLOFENAC SODIUM 10 MG/G
GEL TOPICAL
COMMUNITY
Start: 2022-09-15

## 2022-10-19 RX ORDER — TRIAMCINOLONE ACETONIDE 40 MG/ML
40 INJECTION, SUSPENSION INTRA-ARTICULAR; INTRAMUSCULAR
Status: COMPLETED | OUTPATIENT
Start: 2022-10-19 | End: 2022-10-19

## 2022-10-19 RX ADMIN — TRIAMCINOLONE ACETONIDE 40 MG: 40 INJECTION, SUSPENSION INTRA-ARTICULAR; INTRAMUSCULAR at 03:10

## 2022-10-19 NOTE — PROGRESS NOTES
"Subjective:      Patient ID: Nancy Thompson is a 62 y.o. female.  Chief Complaint: Follow-up (Left shoulder ORIF ( Sx 5/10), c/o shocking pains)      HPI  Nancy Thompson is a  62 y.o. female presenting today for follow up of ORIF left proximal humerus fracture now about 6 months postop   .  She reports that she is had been doing well but seems to had a flare-up recently in the left shoulder   She has completed therapy already been having some pain with use left arm  No numbness or tingling reported   .    Review of patient's allergies indicates:   Allergen Reactions    Hydrocodone Nausea And Vomiting         Current Outpatient Medications   Medication Sig Dispense Refill    diclofenac sodium (VOLTAREN) 1 % Gel Apply topically.      docusate sodium (COLACE) 100 MG capsule Take 1 capsule (100 mg total) by mouth 2 (two) times daily. (Patient not taking: No sig reported)  0    ibuprofen (ADVIL,MOTRIN) 800 MG tablet Take 1 tablet (800 mg total) by mouth 3 (three) times daily after meals. (Patient not taking: No sig reported) 90 tablet 2     No current facility-administered medications for this visit.       Past Medical History:   Diagnosis Date    Hypertension     Kidney problem        Past Surgical History:   Procedure Laterality Date     SECTION, CLASSIC      HYSTERECTOMY      KIDNEY SURGERY      OPEN REDUCTION AND INTERNAL FIXATION (ORIF) OF INJURY OF SHOULDER Left 5/10/2022    Procedure: ORIF, SHOULDER;  Surgeon: Fred Bell Jr., MD;  Location: [facility];  Service: Orthopedics;  Laterality: Left;  need theresa proximal humerus plate (See rep)  C arm, Confirmed 22 AM       OBJECTIVE:   PHYSICAL EXAM:  Height: 5' 5" (165.1 cm) Weight: 62.1 kg (137 lb)  Vitals:    10/19/22 1509   Weight: 62.1 kg (137 lb)   Height: 5' 5" (1.651 m)   PainSc:   8   PainLoc: Shoulder     Ortho/SPM Exam  Examination left shoulder no tenderness no swelling   Incision well healed   No evidence of infection   Range " of motion is slightly decreased with a positive impingement sign  Rotator cuff strength slightly decreased    RADIOGRAPHS:  Previous x-rays show free healed fracture left proximal  Comments: I have personally reviewed the imaging and I agree with the above radiologist's report.    ASSESSMENT/PLAN:     IMPRESSION:  Left shoulder pain after ORIF left humerus fracture    PLAN:  I think she may benefit from injection today   After pause for time-out identified the left shoulder injected with Kenalog 40 mg 2 cc xylocaine sterile technique  Tolerated the procedure well without complication   Continue anti-inflammatory medication by mouth   Regular activities       FOLLOW UP:  1-2 months    Disclaimer: This note has been generated using voice-recognition software. There may be typographical errors that have been missed during proof-reading.

## 2022-12-07 ENCOUNTER — OFFICE VISIT (OUTPATIENT)
Dept: ORTHOPEDICS | Facility: CLINIC | Age: 62
End: 2022-12-07
Payer: MEDICAID

## 2022-12-07 VITALS — WEIGHT: 137 LBS | BODY MASS INDEX: 22.02 KG/M2 | HEIGHT: 66 IN

## 2022-12-07 DIAGNOSIS — Z98.890 S/P ORIF (OPEN REDUCTION INTERNAL FIXATION) FRACTURE: Primary | ICD-10-CM

## 2022-12-07 DIAGNOSIS — Z87.81 S/P ORIF (OPEN REDUCTION INTERNAL FIXATION) FRACTURE: Primary | ICD-10-CM

## 2022-12-07 PROCEDURE — 1159F PR MEDICATION LIST DOCUMENTED IN MEDICAL RECORD: ICD-10-PCS | Mod: CPTII,,, | Performed by: ORTHOPAEDIC SURGERY

## 2022-12-07 PROCEDURE — 1159F MED LIST DOCD IN RCRD: CPT | Mod: CPTII,,, | Performed by: ORTHOPAEDIC SURGERY

## 2022-12-07 PROCEDURE — 99999 PR PBB SHADOW E&M-EST. PATIENT-LVL II: CPT | Mod: PBBFAC,,, | Performed by: ORTHOPAEDIC SURGERY

## 2022-12-07 PROCEDURE — 3008F PR BODY MASS INDEX (BMI) DOCUMENTED: ICD-10-PCS | Mod: CPTII,,, | Performed by: ORTHOPAEDIC SURGERY

## 2022-12-07 PROCEDURE — 3008F BODY MASS INDEX DOCD: CPT | Mod: CPTII,,, | Performed by: ORTHOPAEDIC SURGERY

## 2022-12-07 PROCEDURE — 99213 PR OFFICE/OUTPT VISIT, EST, LEVL III, 20-29 MIN: ICD-10-PCS | Mod: S$PBB,,, | Performed by: ORTHOPAEDIC SURGERY

## 2022-12-07 PROCEDURE — 99999 PR PBB SHADOW E&M-EST. PATIENT-LVL II: ICD-10-PCS | Mod: PBBFAC,,, | Performed by: ORTHOPAEDIC SURGERY

## 2022-12-07 PROCEDURE — 99213 OFFICE O/P EST LOW 20 MIN: CPT | Mod: S$PBB,,, | Performed by: ORTHOPAEDIC SURGERY

## 2022-12-07 PROCEDURE — 99212 OFFICE O/P EST SF 10 MIN: CPT | Mod: PBBFAC,PN | Performed by: ORTHOPAEDIC SURGERY

## 2022-12-07 RX ORDER — MELOXICAM 15 MG/1
15 TABLET ORAL DAILY
Qty: 30 TABLET | Refills: 1 | Status: SHIPPED | OUTPATIENT
Start: 2022-12-07 | End: 2023-01-06

## 2022-12-07 NOTE — PROGRESS NOTES
"Subjective:      Patient ID: Nancy Thompson is a 62 y.o. female.  Chief Complaint: Follow-up (L Shoulder )      HPI  Nancy Thompson is a  62 y.o. female presenting today for follow up of ORIF left proximal humerus fracture.  She reports that she is now 7 months postoperative   Still having some pain which seems to radiate down the left arm  Previously we tried an injection which really did not help much she is also stop physical therapy because this   .    Review of patient's allergies indicates:   Allergen Reactions    Hydrocodone Nausea And Vomiting         Current Outpatient Medications   Medication Sig Dispense Refill    diclofenac sodium (VOLTAREN) 1 % Gel Apply topically.      docusate sodium (COLACE) 100 MG capsule Take 1 capsule (100 mg total) by mouth 2 (two) times daily. (Patient not taking: Reported on 2022)  0    ibuprofen (ADVIL,MOTRIN) 800 MG tablet Take 1 tablet (800 mg total) by mouth 3 (three) times daily after meals. (Patient not taking: Reported on 2022) 90 tablet 2     No current facility-administered medications for this visit.       Past Medical History:   Diagnosis Date    Hypertension     Kidney problem        Past Surgical History:   Procedure Laterality Date     SECTION, CLASSIC      HYSTERECTOMY      KIDNEY SURGERY      OPEN REDUCTION AND INTERNAL FIXATION (ORIF) OF INJURY OF SHOULDER Left 5/10/2022    Procedure: ORIF, SHOULDER;  Surgeon: Fred Bell Jr., MD;  Location: [facility];  Service: Orthopedics;  Laterality: Left;  need theresa proximal humerus plate (See rep)  C arm, Confirmed 22 AM       OBJECTIVE:   PHYSICAL EXAM:  Height: 5' 6" (167.6 cm) Weight: 62.1 kg (137 lb)  Vitals:    22 1418   Weight: 62.1 kg (137 lb)   Height: 5' 6" (1.676 m)   PainSc:   7     Ortho/SPM Exam  Examination left shoulder the incision well healed no swelling no tenderness no evidence of infection  Range of motion is pretty good lacks full elevation with slight " weakness   No instability neurologic exam intact       RADIOGRAPHS:  AP and lateral x-ray left shoulder demonstrate healed fracture of the proximal humerus hardware intact  Comments: I have personally reviewed the imaging and I agree with the above radiologist's report.    ASSESSMENT/PLAN:     IMPRESSION:  Left shoulder and arm pain after previous ORIF proximal humerus fracture    PLAN:  I explained to the patient I am not really sure what is causing her symptoms could be muscular pain in the biceps or it could be something unrelated like cervical radiculopathy   For treatment I recommended that we try physical therapy again for the left shoulder and arm  Also I have started her back on Mobic 15 mg once a day with food   Voltaren gel topical for the symptomatic areas       FOLLOW UP:  2 months if symptoms persist we will need to get a CT scan of the shoulder and arm    Disclaimer: This note has been generated using voice-recognition software. There may be typographical errors that have been missed during proof-reading.

## 2023-02-01 ENCOUNTER — OFFICE VISIT (OUTPATIENT)
Dept: ORTHOPEDICS | Facility: CLINIC | Age: 63
End: 2023-02-01
Payer: MEDICAID

## 2023-02-01 VITALS — BODY MASS INDEX: 21.69 KG/M2 | WEIGHT: 135 LBS | HEIGHT: 66 IN

## 2023-02-01 DIAGNOSIS — Z87.81 S/P ORIF (OPEN REDUCTION INTERNAL FIXATION) FRACTURE: Primary | ICD-10-CM

## 2023-02-01 DIAGNOSIS — Z98.890 S/P ORIF (OPEN REDUCTION INTERNAL FIXATION) FRACTURE: Primary | ICD-10-CM

## 2023-02-01 PROCEDURE — 99213 OFFICE O/P EST LOW 20 MIN: CPT | Mod: S$PBB,,, | Performed by: ORTHOPAEDIC SURGERY

## 2023-02-01 PROCEDURE — 99213 OFFICE O/P EST LOW 20 MIN: CPT | Mod: PBBFAC,PN | Performed by: ORTHOPAEDIC SURGERY

## 2023-02-01 PROCEDURE — 1159F PR MEDICATION LIST DOCUMENTED IN MEDICAL RECORD: ICD-10-PCS | Mod: CPTII,,, | Performed by: ORTHOPAEDIC SURGERY

## 2023-02-01 PROCEDURE — 3008F PR BODY MASS INDEX (BMI) DOCUMENTED: ICD-10-PCS | Mod: CPTII,,, | Performed by: ORTHOPAEDIC SURGERY

## 2023-02-01 PROCEDURE — 1159F MED LIST DOCD IN RCRD: CPT | Mod: CPTII,,, | Performed by: ORTHOPAEDIC SURGERY

## 2023-02-01 PROCEDURE — 3008F BODY MASS INDEX DOCD: CPT | Mod: CPTII,,, | Performed by: ORTHOPAEDIC SURGERY

## 2023-02-01 PROCEDURE — 99213 PR OFFICE/OUTPT VISIT, EST, LEVL III, 20-29 MIN: ICD-10-PCS | Mod: S$PBB,,, | Performed by: ORTHOPAEDIC SURGERY

## 2023-02-01 PROCEDURE — 99999 PR PBB SHADOW E&M-EST. PATIENT-LVL III: ICD-10-PCS | Mod: PBBFAC,,, | Performed by: ORTHOPAEDIC SURGERY

## 2023-02-01 PROCEDURE — 99999 PR PBB SHADOW E&M-EST. PATIENT-LVL III: CPT | Mod: PBBFAC,,, | Performed by: ORTHOPAEDIC SURGERY

## 2023-02-01 NOTE — PROGRESS NOTES
"Subjective:      Patient ID: Nancy Thompson is a 62 y.o. female.  Chief Complaint: Shoulder Pain      HPI  Nancy Thompson is a  62 y.o. female presenting today for follow up of ORIF left proximal humerus fracture.  She reports that she is now 9 months postop  She is doing much better since last visit   The physical therapy helped she is also taking some ibuprofen as well   Pain is minimal.    Review of patient's allergies indicates:   Allergen Reactions    Hydrocodone Nausea And Vomiting         Current Outpatient Medications   Medication Sig Dispense Refill    diclofenac sodium (VOLTAREN) 1 % Gel Apply topically.      docusate sodium (COLACE) 100 MG capsule Take 1 capsule (100 mg total) by mouth 2 (two) times daily. (Patient not taking: Reported on 2022)  0    ibuprofen (ADVIL,MOTRIN) 800 MG tablet Take 1 tablet (800 mg total) by mouth 3 (three) times daily after meals. (Patient not taking: Reported on 2022) 90 tablet 2     No current facility-administered medications for this visit.       Past Medical History:   Diagnosis Date    Hypertension     Kidney problem        Past Surgical History:   Procedure Laterality Date     SECTION, CLASSIC      HYSTERECTOMY      KIDNEY SURGERY      OPEN REDUCTION AND INTERNAL FIXATION (ORIF) OF INJURY OF SHOULDER Left 5/10/2022    Procedure: ORIF, SHOULDER;  Surgeon: Fred Bell Jr., MD;  Location: [facility];  Service: Orthopedics;  Laterality: Left;  need theresa proximal humerus plate (See rep)  C arm, Confirmed 22 AM       OBJECTIVE:   PHYSICAL EXAM:  Height: 5' 6" (167.6 cm) Weight: 61.2 kg (135 lb)  Vitals:    23 1343   Weight: 61.2 kg (135 lb)   Height: 5' 6" (1.676 m)   PainSc: 0-No pain     Ortho/SPM Exam  Examination left arm and shoulder incision well healed no evidence of infection   Range of motion excellent minimal pain negative impingement sign strength is good neurologic exam intact no " instability    RADIOGRAPHS:  None  Comments: I have personally reviewed the imaging and I agree with the above radiologist's report.    ASSESSMENT/PLAN:     IMPRESSION:  Status post ORIF left proximal humerus fracture    PLAN:  She is doing much better so I have just recommended she return to full activities keep an eye on symptoms  Motrin as needed       FOLLOW UP:  2-3 months or as needed    Disclaimer: This note has been generated using voice-recognition software. There may be typographical errors that have been missed during proof-reading.

## (undated) DEVICE — SUT VICRYL 1 CT-1 27 UNDIE

## (undated) DEVICE — GLOVE SURG BIOGEL LATEX SZ 7.5

## (undated) DEVICE — BLADE SCALP OPHTL BEVEL STR

## (undated) DEVICE — ELECTRODE REM PLYHSV RETURN 9

## (undated) DEVICE — DRAPE PLASTIC U 60X72

## (undated) DEVICE — SUT SILK 3-0 STRANDS 30IN

## (undated) DEVICE — GAUZE SPONGE 4X4 12PLY

## (undated) DEVICE — SUT BLU BR 2 TAPERD NDL 1/2

## (undated) DEVICE — SEE MEDLINE ITEM 157116

## (undated) DEVICE — SPONGE LAP 18X18 PREWASHED

## (undated) DEVICE — BNDG COFLEX FOAM LF2 ST 4X5YD

## (undated) DEVICE — SEE MEDLINE ITEM 146292

## (undated) DEVICE — DRAPE STERI INSTRUMENT 1018

## (undated) DEVICE — PACK BASIC

## (undated) DEVICE — BLADE SURG CARBON STEEL #10

## (undated) DEVICE — PAD PREP 50/CA

## (undated) DEVICE — ALCOHOL 70% ISOP W/GREEN 16OZ

## (undated) DEVICE — NDL MAYO CAT 1/2 CIR #6

## (undated) DEVICE — PAD ABDOMINAL 5X9 STERILE

## (undated) DEVICE — APPLICATOR CHLORAPREP ORN 26ML

## (undated) DEVICE — SEE MEDLINE ITEM 157117

## (undated) DEVICE — SLING ARM COMFT NAVY BLU LG

## (undated) DEVICE — 2.5 DRILL

## (undated) DEVICE — BLADE SURG #15 CARBON STEEL

## (undated) DEVICE — TOWEL OR DISP STRL BLUE 4/PK

## (undated) DEVICE — SUT FIBERWIRE #5 1/2 X 38

## (undated) DEVICE — DRAPE C-ARM/MOBILE XRAY 44X80

## (undated) DEVICE — SEE MEDLINE ITEM 157125

## (undated) DEVICE — SEE L#120831

## (undated) DEVICE — SHEET THYROID W/ISO-BAC

## (undated) DEVICE — SEE MEDLINE ITEM 156955

## (undated) DEVICE — SEE MEDLINE ITEM 157131

## (undated) DEVICE — SUT CTD VICRYL 2.0

## (undated) DEVICE — DRESSING XEROFORM FOIL PK 1X8

## (undated) DEVICE — SEE MEDLINE ITEM 157216

## (undated) DEVICE — SUT FIBERWIRE 2 CLLGN COAT

## (undated) DEVICE — DRAPE INCISE IOBAN 2 23X23IN

## (undated) DEVICE — COVER OVERHEAD SURG LT BLUE